# Patient Record
Sex: FEMALE | Race: WHITE | NOT HISPANIC OR LATINO | Employment: OTHER | URBAN - METROPOLITAN AREA
[De-identification: names, ages, dates, MRNs, and addresses within clinical notes are randomized per-mention and may not be internally consistent; named-entity substitution may affect disease eponyms.]

---

## 2017-05-02 DIAGNOSIS — Z86.73 ARTERIAL ISCHEMIC STROKE, CHRONIC: ICD-10-CM

## 2017-05-02 DIAGNOSIS — I10 ESSENTIAL HYPERTENSION: Primary | ICD-10-CM

## 2017-05-10 ENCOUNTER — LAB VISIT (OUTPATIENT)
Dept: LAB | Facility: HOSPITAL | Age: 82
End: 2017-05-10
Attending: FAMILY MEDICINE
Payer: MEDICARE

## 2017-05-10 DIAGNOSIS — Z00.00 ROUTINE GENERAL MEDICAL EXAMINATION AT A HEALTH CARE FACILITY: ICD-10-CM

## 2017-05-10 DIAGNOSIS — I10 ESSENTIAL HYPERTENSION: ICD-10-CM

## 2017-05-10 DIAGNOSIS — Z86.73 ARTERIAL ISCHEMIC STROKE, CHRONIC: ICD-10-CM

## 2017-05-10 LAB
ALBUMIN SERPL BCP-MCNC: 3.8 G/DL
ALP SERPL-CCNC: 68 U/L
ALT SERPL W/O P-5'-P-CCNC: 19 U/L
ANION GAP SERPL CALC-SCNC: 12 MMOL/L
AST SERPL-CCNC: 26 U/L
BASOPHILS # BLD AUTO: 0.02 K/UL
BASOPHILS NFR BLD: 0.3 %
BILIRUB SERPL-MCNC: 0.5 MG/DL
BUN SERPL-MCNC: 23 MG/DL
CALCIUM SERPL-MCNC: 9.5 MG/DL
CHLORIDE SERPL-SCNC: 103 MMOL/L
CHOLEST/HDLC SERPL: 3.7 {RATIO}
CO2 SERPL-SCNC: 25 MMOL/L
CREAT SERPL-MCNC: 1.1 MG/DL
DIFFERENTIAL METHOD: ABNORMAL
EOSINOPHIL # BLD AUTO: 0 K/UL
EOSINOPHIL NFR BLD: 0.6 %
ERYTHROCYTE [DISTWIDTH] IN BLOOD BY AUTOMATED COUNT: 13.7 %
EST. GFR  (AFRICAN AMERICAN): 53.7 ML/MIN/1.73 M^2
EST. GFR  (NON AFRICAN AMERICAN): 46.5 ML/MIN/1.73 M^2
GLUCOSE SERPL-MCNC: 88 MG/DL
HCT VFR BLD AUTO: 44.5 %
HDL/CHOLESTEROL RATIO: 27.2 %
HDLC SERPL-MCNC: 217 MG/DL
HDLC SERPL-MCNC: 59 MG/DL
HGB BLD-MCNC: 14.7 G/DL
LDLC SERPL CALC-MCNC: 141.2 MG/DL
LYMPHOCYTES # BLD AUTO: 2.1 K/UL
LYMPHOCYTES NFR BLD: 30.4 %
MCH RBC QN AUTO: 32 PG
MCHC RBC AUTO-ENTMCNC: 33 %
MCV RBC AUTO: 97 FL
MONOCYTES # BLD AUTO: 0.6 K/UL
MONOCYTES NFR BLD: 8.4 %
NEUTROPHILS # BLD AUTO: 4.1 K/UL
NEUTROPHILS NFR BLD: 60.2 %
NONHDLC SERPL-MCNC: 158 MG/DL
PLATELET # BLD AUTO: 243 K/UL
PMV BLD AUTO: 11.2 FL
POTASSIUM SERPL-SCNC: 4.3 MMOL/L
PROT SERPL-MCNC: 7 G/DL
RBC # BLD AUTO: 4.59 M/UL
SODIUM SERPL-SCNC: 140 MMOL/L
TRIGL SERPL-MCNC: 84 MG/DL
TSH SERPL DL<=0.005 MIU/L-ACNC: 1.84 UIU/ML
WBC # BLD AUTO: 6.78 K/UL

## 2017-05-10 PROCEDURE — 80053 COMPREHEN METABOLIC PANEL: CPT

## 2017-05-10 PROCEDURE — 36415 COLL VENOUS BLD VENIPUNCTURE: CPT | Mod: PO

## 2017-05-10 PROCEDURE — 84443 ASSAY THYROID STIM HORMONE: CPT

## 2017-05-10 PROCEDURE — 80061 LIPID PANEL: CPT

## 2017-05-10 PROCEDURE — 85025 COMPLETE CBC W/AUTO DIFF WBC: CPT

## 2017-05-15 ENCOUNTER — OFFICE VISIT (OUTPATIENT)
Dept: INTERNAL MEDICINE | Facility: CLINIC | Age: 82
End: 2017-05-15
Payer: MEDICARE

## 2017-05-15 VITALS
HEIGHT: 65 IN | HEART RATE: 90 BPM | TEMPERATURE: 98 F | DIASTOLIC BLOOD PRESSURE: 80 MMHG | BODY MASS INDEX: 26.93 KG/M2 | WEIGHT: 161.63 LBS | SYSTOLIC BLOOD PRESSURE: 128 MMHG

## 2017-05-15 DIAGNOSIS — Z86.73 HISTORY OF STROKE: ICD-10-CM

## 2017-05-15 DIAGNOSIS — E78.5 HYPERLIPIDEMIA, UNSPECIFIED HYPERLIPIDEMIA TYPE: ICD-10-CM

## 2017-05-15 DIAGNOSIS — N18.3 CHRONIC KIDNEY DISEASE (CKD), STAGE 3 (MODERATE): ICD-10-CM

## 2017-05-15 DIAGNOSIS — Z00.00 ROUTINE GENERAL MEDICAL EXAMINATION AT A HEALTH CARE FACILITY: Primary | ICD-10-CM

## 2017-05-15 PROBLEM — N18.30 STAGE 3 CHRONIC KIDNEY DISEASE: Status: ACTIVE | Noted: 2017-05-15

## 2017-05-15 PROCEDURE — 3079F DIAST BP 80-89 MM HG: CPT | Mod: S$GLB,,, | Performed by: FAMILY MEDICINE

## 2017-05-15 PROCEDURE — 99499 UNLISTED E&M SERVICE: CPT | Mod: S$GLB,,, | Performed by: FAMILY MEDICINE

## 2017-05-15 PROCEDURE — 99999 PR PBB SHADOW E&M-EST. PATIENT-LVL III: CPT | Mod: PBBFAC,,, | Performed by: FAMILY MEDICINE

## 2017-05-15 PROCEDURE — 3074F SYST BP LT 130 MM HG: CPT | Mod: S$GLB,,, | Performed by: FAMILY MEDICINE

## 2017-05-15 PROCEDURE — 99397 PER PM REEVAL EST PAT 65+ YR: CPT | Mod: S$GLB,,, | Performed by: FAMILY MEDICINE

## 2017-05-15 NOTE — PROGRESS NOTES
Subjective:      Patient ID: Samanta Charlton is a 83 y.o. female.    Chief Complaint: Annual Exam    HPI Comments: Patient is here today for prevention exam.  She's really been working hard on her diet and exercising more.  Her cholesterol is  improving.  She's reduced the salt in her diet.  Her blood pressure looks great today.  She's only taking a daily aspirin and vitamins. Wt is stable. She has tried multiple antihypertensives and did not tolerate any of them.    She declines a flu shot and mammogram. Hx of aterial stroke but no deficits. Is doing  exercise.       Lab Results   Component Value Date    WBC 6.78 05/10/2017    HGB 14.7 05/10/2017    HCT 44.5 05/10/2017     05/10/2017    CHOL 217 (H) 05/10/2017    TRIG 84 05/10/2017    HDL 59 05/10/2017    ALT 19 05/10/2017    AST 26 05/10/2017     05/10/2017    K 4.3 05/10/2017     05/10/2017    CREATININE 1.1 05/10/2017    BUN 23 05/10/2017    CO2 25 05/10/2017    TSH 1.843 05/10/2017       Review of Systems   Constitutional: Positive for activity change and appetite change. Negative for chills, fatigue and fever.   HENT: Negative for ear pain and trouble swallowing.    Eyes: Negative for pain and visual disturbance.   Respiratory: Negative for cough and shortness of breath.    Cardiovascular: Negative for chest pain and leg swelling.   Gastrointestinal: Negative for abdominal pain, blood in stool, nausea and vomiting.   Endocrine: Negative for cold intolerance and heat intolerance.   Genitourinary: Negative for dysuria and frequency.   Musculoskeletal: Negative for joint swelling, myalgias and neck pain.   Skin: Negative for color change and rash.   Neurological: Negative for dizziness and headaches.   Psychiatric/Behavioral: Negative for behavioral problems and sleep disturbance.     Objective:     Physical Exam   Constitutional: She is oriented to person, place, and time. She appears well-developed and well-nourished.   HENT:   Head:  Normocephalic and atraumatic.   Right Ear: External ear normal.   Left Ear: External ear normal.   Mouth/Throat: Oropharynx is clear and moist.   Eyes: EOM are normal.   Neck: Normal range of motion. Neck supple. Carotid bruit is not present. No thyromegaly present.   Cardiovascular: Normal rate and regular rhythm.  Exam reveals no gallop and no friction rub.    No murmur heard.  Pulmonary/Chest: Effort normal. No respiratory distress. She has no wheezes. She has no rales.   Abdominal: Soft. Bowel sounds are normal. She exhibits no distension. There is no tenderness. There is no rebound.   Musculoskeletal: Normal range of motion. She exhibits no edema.   Lymphadenopathy:     She has no cervical adenopathy.   Neurological: She is alert and oriented to person, place, and time.   Skin: Skin is warm and dry. No rash noted.   Psychiatric: She has a normal mood and affect. Her behavior is normal. Judgment and thought content normal.   Vitals reviewed.    Assessment:     1. Routine general medical examination at a health care facility    2. History of stroke    3. Hyperlipidemia, unspecified hyperlipidemia type    4. Chronic kidney disease (CKD), stage 3 (moderate)      Plan:   Samanta was seen today for annual exam.    Diagnoses and all orders for this visit:    Routine general medical examination at a health care facility - - labs reviewed. Discussed Health Maintenance issues.     -     Lipid panel; Future  -     Comprehensive metabolic panel; Future    History of stroke-currently on an aspirin declines statin therapy declines blood pressure medication.  -     Lipid panel; Future  -     Comprehensive metabolic panel; Future    Hyperlipidemia, unspecified hyperlipidemia type-improving control with diet alone declines statin therapy.  -     Lipid panel; Future  -     Comprehensive metabolic panel; Future      Chronic kidney disease-secondary to decreased water consumption and age.  Stressed importance of increasing water and  diet.      Return in about 1 year (around 5/15/2018) for physical.

## 2018-04-05 ENCOUNTER — TELEPHONE (OUTPATIENT)
Dept: INTERNAL MEDICINE | Facility: CLINIC | Age: 83
End: 2018-04-05

## 2018-04-05 DIAGNOSIS — E55.9 VITAMIN D DEFICIENCY: Primary | ICD-10-CM

## 2018-04-05 DIAGNOSIS — E61.1 IRON DEFICIENCY: ICD-10-CM

## 2018-04-05 NOTE — TELEPHONE ENCOUNTER
----- Message from Stephanie Wood sent at 4/5/2018 12:55 PM CDT -----  Contact: Patient  Patient is requesting specific labs before she makes an annual appt, please call her back at [phone]. Thank you

## 2018-04-05 NOTE — TELEPHONE ENCOUNTER
Pt is scheduled for her annual in May, she has blood work the week before. She would like to know if she can have a Vit D level and then her iron checked. She as of now has a CMP and Lipid in.

## 2018-05-15 ENCOUNTER — LAB VISIT (OUTPATIENT)
Dept: LAB | Facility: HOSPITAL | Age: 83
End: 2018-05-15
Attending: FAMILY MEDICINE
Payer: MEDICARE

## 2018-05-15 DIAGNOSIS — E78.5 HYPERLIPIDEMIA, UNSPECIFIED HYPERLIPIDEMIA TYPE: ICD-10-CM

## 2018-05-15 DIAGNOSIS — E55.9 VITAMIN D DEFICIENCY: ICD-10-CM

## 2018-05-15 DIAGNOSIS — Z00.00 ROUTINE GENERAL MEDICAL EXAMINATION AT A HEALTH CARE FACILITY: ICD-10-CM

## 2018-05-15 DIAGNOSIS — Z86.73 HISTORY OF STROKE: ICD-10-CM

## 2018-05-15 DIAGNOSIS — E61.1 IRON DEFICIENCY: ICD-10-CM

## 2018-05-15 LAB
25(OH)D3+25(OH)D2 SERPL-MCNC: 31 NG/ML
ALBUMIN SERPL BCP-MCNC: 3.9 G/DL
ALP SERPL-CCNC: 65 U/L
ALT SERPL W/O P-5'-P-CCNC: 13 U/L
ANION GAP SERPL CALC-SCNC: 10 MMOL/L
AST SERPL-CCNC: 22 U/L
BASOPHILS # BLD AUTO: 0.03 K/UL
BASOPHILS NFR BLD: 0.5 %
BILIRUB SERPL-MCNC: 0.7 MG/DL
BUN SERPL-MCNC: 21 MG/DL
CALCIUM SERPL-MCNC: 10.4 MG/DL
CHLORIDE SERPL-SCNC: 104 MMOL/L
CHOLEST SERPL-MCNC: 216 MG/DL
CHOLEST/HDLC SERPL: 3.9 {RATIO}
CO2 SERPL-SCNC: 28 MMOL/L
CREAT SERPL-MCNC: 0.9 MG/DL
DIFFERENTIAL METHOD: ABNORMAL
EOSINOPHIL # BLD AUTO: 0.1 K/UL
EOSINOPHIL NFR BLD: 0.9 %
ERYTHROCYTE [DISTWIDTH] IN BLOOD BY AUTOMATED COUNT: 14.2 %
EST. GFR  (AFRICAN AMERICAN): >60 ML/MIN/1.73 M^2
EST. GFR  (NON AFRICAN AMERICAN): 58.9 ML/MIN/1.73 M^2
GLUCOSE SERPL-MCNC: 77 MG/DL
HCT VFR BLD AUTO: 46 %
HDLC SERPL-MCNC: 56 MG/DL
HDLC SERPL: 25.9 %
HGB BLD-MCNC: 14.7 G/DL
IMM GRANULOCYTES # BLD AUTO: 0.02 K/UL
IMM GRANULOCYTES NFR BLD AUTO: 0.3 %
IRON SERPL-MCNC: 86 UG/DL
LDLC SERPL CALC-MCNC: 142.2 MG/DL
LYMPHOCYTES # BLD AUTO: 1.5 K/UL
LYMPHOCYTES NFR BLD: 26.7 %
MCH RBC QN AUTO: 32.1 PG
MCHC RBC AUTO-ENTMCNC: 32 G/DL
MCV RBC AUTO: 100 FL
MONOCYTES # BLD AUTO: 0.6 K/UL
MONOCYTES NFR BLD: 10.6 %
NEUTROPHILS # BLD AUTO: 3.5 K/UL
NEUTROPHILS NFR BLD: 61 %
NONHDLC SERPL-MCNC: 160 MG/DL
NRBC BLD-RTO: 0 /100 WBC
PLATELET # BLD AUTO: 231 K/UL
PMV BLD AUTO: 10.9 FL
POTASSIUM SERPL-SCNC: 4.6 MMOL/L
PROT SERPL-MCNC: 7 G/DL
RBC # BLD AUTO: 4.58 M/UL
SATURATED IRON: 23 %
SODIUM SERPL-SCNC: 142 MMOL/L
TOTAL IRON BINDING CAPACITY: 371 UG/DL
TRANSFERRIN SERPL-MCNC: 251 MG/DL
TRIGL SERPL-MCNC: 89 MG/DL
WBC # BLD AUTO: 5.76 K/UL

## 2018-05-15 PROCEDURE — 82306 VITAMIN D 25 HYDROXY: CPT

## 2018-05-15 PROCEDURE — 80053 COMPREHEN METABOLIC PANEL: CPT

## 2018-05-15 PROCEDURE — 80061 LIPID PANEL: CPT

## 2018-05-15 PROCEDURE — 83540 ASSAY OF IRON: CPT

## 2018-05-15 PROCEDURE — 85025 COMPLETE CBC W/AUTO DIFF WBC: CPT

## 2018-05-15 PROCEDURE — 36415 COLL VENOUS BLD VENIPUNCTURE: CPT | Mod: PO

## 2018-05-23 ENCOUNTER — OFFICE VISIT (OUTPATIENT)
Dept: INTERNAL MEDICINE | Facility: CLINIC | Age: 83
End: 2018-05-23
Payer: MEDICARE

## 2018-05-23 VITALS
HEIGHT: 65 IN | TEMPERATURE: 99 F | BODY MASS INDEX: 26.67 KG/M2 | SYSTOLIC BLOOD PRESSURE: 138 MMHG | DIASTOLIC BLOOD PRESSURE: 88 MMHG | WEIGHT: 160.06 LBS

## 2018-05-23 DIAGNOSIS — N18.30 STAGE 3 CHRONIC KIDNEY DISEASE: ICD-10-CM

## 2018-05-23 DIAGNOSIS — I77.1 TORTUOUS AORTA: ICD-10-CM

## 2018-05-23 DIAGNOSIS — E78.5 HYPERLIPIDEMIA, UNSPECIFIED HYPERLIPIDEMIA TYPE: ICD-10-CM

## 2018-05-23 DIAGNOSIS — Z00.00 ROUTINE GENERAL MEDICAL EXAMINATION AT A HEALTH CARE FACILITY: Primary | ICD-10-CM

## 2018-05-23 PROCEDURE — 99499 UNLISTED E&M SERVICE: CPT | Mod: S$GLB,,, | Performed by: FAMILY MEDICINE

## 2018-05-23 PROCEDURE — 99999 PR PBB SHADOW E&M-EST. PATIENT-LVL III: CPT | Mod: PBBFAC,,, | Performed by: FAMILY MEDICINE

## 2018-05-23 PROCEDURE — 99397 PER PM REEVAL EST PAT 65+ YR: CPT | Mod: S$GLB,,, | Performed by: FAMILY MEDICINE

## 2018-05-23 PROCEDURE — 3079F DIAST BP 80-89 MM HG: CPT | Mod: CPTII,S$GLB,, | Performed by: FAMILY MEDICINE

## 2018-05-23 PROCEDURE — 3075F SYST BP GE 130 - 139MM HG: CPT | Mod: CPTII,S$GLB,, | Performed by: FAMILY MEDICINE

## 2018-05-23 RX ORDER — AMOXICILLIN 500 MG
CAPSULE ORAL DAILY
COMMUNITY

## 2018-05-23 RX ORDER — MULTIVIT WITH MINERALS/HERBS
1 TABLET ORAL DAILY
COMMUNITY

## 2018-05-23 RX ORDER — GUAIFENESIN AND PHENYLEPHRINE HCL 400; 10 MG/1; MG/1
TABLET ORAL
COMMUNITY

## 2018-05-23 RX ORDER — MAGNESIUM 30 MG
TABLET ORAL ONCE
COMMUNITY

## 2018-05-23 RX ORDER — FERROUS SULFATE, DRIED 160(50) MG
1 TABLET, EXTENDED RELEASE ORAL 2 TIMES DAILY WITH MEALS
COMMUNITY

## 2018-05-27 PROBLEM — I77.1 TORTUOUS AORTA: Status: ACTIVE | Noted: 2018-05-27

## 2018-05-27 NOTE — PROGRESS NOTES
Subjective:      Patient ID: Samanta Charlton is a 84 y.o. female.    Chief Complaint: Annual Exam    Disclaimer:  This note is prepared using voice recognition software and as such is likely to have errors and has not been proof read. Please contact me for questions.     Patient is here today for prevention exam.  She's really been working hard on her diet and exercising more.  Her cholesterol is  improving.  She's reduced the salt in her diet.  Her blood pressure looks great today.  She's only taking a daily aspirin and vitamins. Wt is stable, she has also been exercising more.  Trying to swim.  Going to be moving in to the pull house near her children.  On multivitamin B complex , omega 3, and co- Q10.. She has tried multiple antihypertensives and did not tolerate any of them.    She declines a flu shot and mammogram. Hx of aterial stroke but no deficits.  Declines doing any prescription medications for blood pressure control.  Does have issues of stage 3 chronic kidney disease mainly due to age.  Has some aortic tortuosity but not willing to statin therapy.  Does take an aspirin intermittently.  I an unable to review Legacy documents from 2010 at this time to determine if there is a calcified granuloma.  There is no record of imaging in our system currently with epic at this time.  In particular no CT scans available for review at this time.          Lab Results   Component Value Date    WBC 5.76 05/15/2018    HGB 14.7 05/15/2018    HCT 46.0 05/15/2018     05/15/2018    CHOL 216 (H) 05/15/2018    TRIG 89 05/15/2018    HDL 56 05/15/2018    ALT 13 05/15/2018    AST 22 05/15/2018     05/15/2018    K 4.6 05/15/2018     05/15/2018    CREATININE 0.9 05/15/2018    BUN 21 05/15/2018    CO2 28 05/15/2018    TSH 1.843 05/10/2017       Review of Systems   Constitutional: Negative for chills, fatigue and fever.   HENT: Negative for ear pain and trouble swallowing.    Eyes: Negative for pain and visual  "disturbance.   Respiratory: Negative for cough and shortness of breath.    Cardiovascular: Negative for chest pain and leg swelling.   Gastrointestinal: Negative for abdominal pain, blood in stool, nausea and vomiting.   Endocrine: Negative for cold intolerance and heat intolerance.   Genitourinary: Negative for dysuria and frequency.   Musculoskeletal: Negative for joint swelling, myalgias and neck pain.   Skin: Negative for color change and rash.   Neurological: Negative for dizziness and headaches.   Psychiatric/Behavioral: Negative for behavioral problems and sleep disturbance.     Objective:     Vitals:    05/23/18 1307 05/23/18 1337   BP:  138/88   Temp: 98.9 °F (37.2 °C)    TempSrc: Tympanic    Weight: 72.6 kg (160 lb 0.9 oz)    Height: 5' 4.5" (1.638 m)      Physical Exam   Constitutional: She is oriented to person, place, and time. She appears well-developed and well-nourished.   HENT:   Head: Normocephalic and atraumatic.   Right Ear: External ear normal.   Left Ear: External ear normal.   Mouth/Throat: Oropharynx is clear and moist.   Eyes: EOM are normal.   Neck: Normal range of motion. Neck supple. No thyromegaly present.   Cardiovascular: Normal rate and regular rhythm.  Exam reveals no gallop and no friction rub.    No murmur heard.  Pulmonary/Chest: Effort normal. No respiratory distress. She has no wheezes. She has no rales.   Abdominal: Soft. Bowel sounds are normal. She exhibits no distension. There is no tenderness. There is no rebound.   Musculoskeletal: Normal range of motion. She exhibits no edema.   Lymphadenopathy:     She has no cervical adenopathy.   Neurological: She is alert and oriented to person, place, and time.   Skin: Skin is warm and dry. No rash noted.   Psychiatric: She has a normal mood and affect. Her behavior is normal. Judgment and thought content normal.   Vitals reviewed.    Assessment:     1. Routine general medical examination at a health care facility    2. Stage 3 " chronic kidney disease    3. Hyperlipidemia, unspecified hyperlipidemia type    4. Tortuous aorta      Plan:   Samanta was seen today for annual exam.    Diagnoses and all orders for this visit:    Routine general medical examination at a health care facility-labs reviewed today discussed health maintenance issues declines prescription medications at this time  Declines vaccines.      Stage 3 chronic kidney disease.  - stable, Continue with current medications and interventions. Labs reviewed.     Hyperlipidemia, unspecified hyperlipidemia type - stable, Continue with current supplements and interventions. Labs reviewed.       Tortuous aorta - stable, Continue with current supplements and interventions. Labs reviewed.             Follow-up in about 1 year (around 5/23/2019) for physical with Dr GUTIERRES.

## 2018-06-25 ENCOUNTER — TELEPHONE (OUTPATIENT)
Dept: INTERNAL MEDICINE | Facility: CLINIC | Age: 83
End: 2018-06-25

## 2018-06-25 NOTE — TELEPHONE ENCOUNTER
----- Message from Chip Solano sent at 6/25/2018  5:23 PM CDT -----  Contact: Pt  Pt would like to be called back regarding having a twist right knee.   Pt stated they went to the [facility] Last Thursday night and would like to have an franklin asap.Tomorrow After 1:30pm or next day after 2:00pm.  Pt can be reached at [phone].    Thank You.

## 2018-06-25 NOTE — TELEPHONE ENCOUNTER
Patient states that she twisted her knee Thursday and went to the Er at [facility] and they did xray bit did not give her pain meds, a brace and they were worthless rec req from [facility] to see if patient need to see you or ortho

## 2018-06-26 DIAGNOSIS — M25.561 RIGHT KNEE PAIN, UNSPECIFIED CHRONICITY: Primary | ICD-10-CM

## 2018-06-26 NOTE — TELEPHONE ENCOUNTER
I called and advised patient that  reviewed rec from the [facility] and recc she see a ortho specialist, offered to schedule franklin and patient declined and I gave her contact info for ortho becouse she verbalized that she would have to have someone take her. I also advised patient that she may want to contact [facility] to ask about get a copy / disc of images to poss avoid having to be xrayed again and patient expressed understanding.[email or web address]

## 2018-06-27 ENCOUNTER — HOSPITAL ENCOUNTER (OUTPATIENT)
Dept: RADIOLOGY | Facility: HOSPITAL | Age: 83
Discharge: HOME | End: 2018-06-27
Attending: ORTHOPAEDIC SURGERY
Payer: MEDICARE

## 2018-06-27 ENCOUNTER — OFFICE VISIT (OUTPATIENT)
Dept: ORTHOPEDICS | Facility: CLINIC | Age: 83
End: 2018-06-27
Payer: MEDICARE

## 2018-06-27 ENCOUNTER — PES CALL (OUTPATIENT)
Dept: ADMINISTRATIVE | Facility: CLINIC | Age: 83
End: 2018-06-27

## 2018-06-27 VITALS
HEIGHT: 65 IN | BODY MASS INDEX: 26.67 KG/M2 | DIASTOLIC BLOOD PRESSURE: 75 MMHG | SYSTOLIC BLOOD PRESSURE: 149 MMHG | HEART RATE: 77 BPM | WEIGHT: 160.06 LBS

## 2018-06-27 DIAGNOSIS — M25.561 CHRONIC PAIN OF RIGHT KNEE: Primary | ICD-10-CM

## 2018-06-27 DIAGNOSIS — S76.311A STRAIN OF RIGHT HAMSTRING: ICD-10-CM

## 2018-06-27 DIAGNOSIS — S83.411A SPRAIN OF MEDIAL COLLATERAL LIGAMENT OF RIGHT KNEE, INITIAL ENCOUNTER: Primary | ICD-10-CM

## 2018-06-27 DIAGNOSIS — M25.561 RIGHT KNEE PAIN, UNSPECIFIED CHRONICITY: ICD-10-CM

## 2018-06-27 DIAGNOSIS — R52 PAIN: Primary | ICD-10-CM

## 2018-06-27 DIAGNOSIS — G89.29 CHRONIC PAIN OF RIGHT KNEE: Primary | ICD-10-CM

## 2018-06-27 PROCEDURE — 73562 X-RAY EXAM OF KNEE 3: CPT | Mod: TC,59,FY,PO,LT

## 2018-06-27 PROCEDURE — 99999 PR PBB SHADOW E&M-EST. PATIENT-LVL III: CPT | Mod: PBBFAC,,, | Performed by: ORTHOPAEDIC SURGERY

## 2018-06-27 PROCEDURE — 3077F SYST BP >= 140 MM HG: CPT | Mod: CPTII,S$GLB,, | Performed by: ORTHOPAEDIC SURGERY

## 2018-06-27 PROCEDURE — 73562 X-RAY EXAM OF KNEE 3: CPT | Mod: 26,XS,LT, | Performed by: RADIOLOGY

## 2018-06-27 PROCEDURE — 73564 X-RAY EXAM KNEE 4 OR MORE: CPT | Mod: 26,RT,, | Performed by: RADIOLOGY

## 2018-06-27 PROCEDURE — 3078F DIAST BP <80 MM HG: CPT | Mod: CPTII,S$GLB,, | Performed by: ORTHOPAEDIC SURGERY

## 2018-06-27 PROCEDURE — 99204 OFFICE O/P NEW MOD 45 MIN: CPT | Mod: S$GLB,,, | Performed by: ORTHOPAEDIC SURGERY

## 2018-06-27 NOTE — PROGRESS NOTES
Subjective:     Patient ID: Samanta Charlton is a 84 y.o. female.    Chief Complaint: Pain of the Right Knee    Samanta Charlton is a 84 y.o. female here for R knee pain was moving a pot on a skateboard and bent her knee. Pain has improved.      Knee Pain    The pain is present in the right knee. This is a new problem. The current episode started in the past 7 days. The injury was the result of a twisting action while at home. The problem occurs intermittently. The problem has been gradually worsening. The quality of the pain is described as aching. The pain is at a severity of 0/10. Associated symptoms include joint swelling. Pertinent negatives include no fever or numbness. The symptoms are aggravated by activity. She has tried heat and cold for the symptoms. The treatment provided mild relief. Physical therapy was not tried.      Past Medical History:   Diagnosis Date    Acute ischemic stroke     Allergic rhinitis     HTN (hypertension)     Hyperlipidemia     Osteoporosis, unspecified     Sinusitis      Past Surgical History:   Procedure Laterality Date    HYSTERECTOMY       History reviewed. No pertinent family history.  Social History     Social History    Marital status:      Spouse name: N/A    Number of children: N/A    Years of education: N/A     Occupational History    Not on file.     Social History Main Topics    Smoking status: Former Smoker    Smokeless tobacco: Never Used    Alcohol use No    Drug use: No    Sexual activity: No     Other Topics Concern    Not on file     Social History Narrative    No narrative on file     Medication List with Changes/Refills   Current Medications    B COMPLEX VITAMINS TABLET    Take 1 tablet by mouth once daily.    CALCIUM-VITAMIN D3 500 MG(1,250MG) -200 UNIT PER TABLET    Take 1 tablet by mouth 2 (two) times daily with meals.    FISH OIL-OMEGA-3 FATTY ACIDS 300-1,000 MG CAPSULE    Take by mouth once daily.    MAGNESIUM 30 MG TAB    Take by  mouth once.    MULTIVITAMIN WITH MINERALS TABLET    Take 1 tablet by mouth once daily.    TURMERIC ROOT EXTRACT 500 MG CAP    Take by mouth.     Review of patient's allergies indicates:   Allergen Reactions    Ace inhibitors Nausea And Vomiting    Amlodipine Edema    Codeine      Other reaction(s): Hallucinations    Hydrochlorothiazide Nausea And Vomiting    Sulfa (sulfonamide antibiotics) Nausea And Vomiting    Benicar  [olmesartan] Rash     Review of Systems   Constitution: Negative for fever and night sweats.   HENT: Positive for hearing loss.    Eyes: Negative for blurred vision and visual disturbance.   Cardiovascular: Positive for leg swelling. Negative for chest pain.   Respiratory: Negative for shortness of breath.    Endocrine: Negative for polyuria.   Hematologic/Lymphatic: Negative for bleeding problem.   Skin: Negative for rash.   Musculoskeletal: Positive for joint pain. Negative for back pain, joint swelling, muscle cramps and muscle weakness.   Gastrointestinal: Negative for melena.   Genitourinary: Negative for hematuria.   Neurological: Negative for loss of balance, numbness and paresthesias.   Psychiatric/Behavioral: Negative for altered mental status.       Objective:   Body mass index is 27.05 kg/m².  Vitals:    06/27/18 1346   BP: (!) 149/75   Pulse: 77       General: Samanta is well-developed, well-nourished, appears stated age, in no acute distress, alert and oriented to time, place and person.       General    Vitals reviewed.  Constitutional: She is oriented to person, place, and time. She appears well-developed and well-nourished. No distress.   HENT:   Mouth/Throat: No oropharyngeal exudate.   Eyes: Right eye exhibits no discharge. Left eye exhibits no discharge.   Neck: Normal range of motion.   Pulmonary/Chest: Effort normal. No respiratory distress.   Neurological: She is alert and oriented to person, place, and time. She has normal reflexes. No cranial nerve deficit. Coordination  normal.   Psychiatric: She has a normal mood and affect. Her behavior is normal. Judgment and thought content normal.     General Musculoskeletal Exam   Gait: normal       Right Knee Exam     Inspection   Erythema: absent  Scars: absent  Swelling: absent  Effusion: effusion  Deformity: deformity  Bruising: present    Tenderness   The patient is tender to palpation of the MCL and medial hamstring.    Range of Motion   Extension: 0   Flexion: 130     Tests   Meniscus   Charlette:  Medial - negative Lateral - negative  Ligament Examination Lachman: normal (-1 to 2mm) PCL-Posterior Drawer: normal (0 to 2mm)     MCL - Valgus: abnormal - grade I  LCL - Varus: normal  Patella   Patellar Apprehension: negative  Passive Patellar Tilt: neutral  Patellar Tracking: normal  Patellar Glide (quadrants): Lateral - 1   Medial - 2  Q-Angle at 90 degrees: normal  Patellar Grind: positive    Other   Meniscal Cyst: absent  Popliteal (Baker's) Cyst: absent  Sensation: normal    Left Knee Exam     Inspection   Erythema: absent  Scars: absent  Swelling: absent  Effusion: absent  Deformity: deformity  Bruising: absent    Tenderness   The patient is experiencing no tenderness.         Range of Motion   Extension: 0   Flexion: 130     Tests   Meniscus   Charlette:  Medial - negative Lateral - negative  Stability Lachman: normal (-1 to 2mm)   MCL - Valgus: normal (0 to 2mm)  LCL - Varus: normal (0 to 2mm)  Patella   Patellar Apprehension: negative  Passive Patellar Tilt: neutral  Patellar Tracking: normal  Patellar Glide (Quadrants): Lateral - 1 Medial - 2  Q-Angle at 90 degrees: normal  Patellar Grind: negative    Other   Meniscal Cyst: absent  Popliteal (Baker's) Cyst: absent  Sensation: normal    Right Hip Exam     Tests   Hardik: negative  Left Hip Exam     Tests   Hardik: negative          Muscle Strength   Right Lower Extremity   Hip Abduction: 5/5   Quadriceps:  4/5   Hamstrin/5   Left Lower Extremity   Hip Abduction: 5/5   Quadriceps:   5/5   Hamstrin/5     Reflexes     Left Side  Quadriceps:  2+  Achilles:  2+    Right Side   Quadriceps:  2+  Achilles:  2+    Vascular Exam     Right Pulses  Dorsalis Pedis:      2+  Posterior Tibial:      2+        Left Pulses  Dorsalis Pedis:      2+  Posterior Tibial:      2+          X-rays including standing, weight bearing AP and flexion bilateral knees, lateral and merchant views ordered and images reviewed by me show:    No fracture, dislocation or other pathology   Medial compartment: mild degenerative changes   Lateral compartment: no degenerative changes   Patellofemoral compartment: no degenerative changes   Mild calcification right MCL    Assessment:     Encounter Diagnoses   Name Primary?    Sprain of medial collateral ligament of right knee, initial encounter Yes    Strain of right hamstring         Plan:     1. Hinged knee brace    2. Continue nsaids    3. PT/OT    4. F/up 4-6wk

## 2018-07-02 ENCOUNTER — TELEPHONE (OUTPATIENT)
Dept: INTERNAL MEDICINE | Facility: CLINIC | Age: 83
End: 2018-07-02

## 2018-07-02 NOTE — TELEPHONE ENCOUNTER
PT needed for Right Knee. Requesting to see about doing HH PT. She is not driving. They recommended her to do therapy twice a week. She is okay with using [facility] .

## 2018-07-02 NOTE — TELEPHONE ENCOUNTER
----- Message from Deon Arreguin sent at 7/2/2018 11:17 AM CDT -----  Contact: pt [phone]  States she's calling rg being referred from her Ortho to see phys therapy and wants to knw if she can have phys therapy home health and can be reached at [phone]//[email or web address]

## 2018-07-19 ENCOUNTER — TELEPHONE (OUTPATIENT)
Dept: ADMINISTRATIVE | Facility: CLINIC | Age: 83
End: 2018-07-19

## 2019-02-14 NOTE — MR AVS SNAPSHOT
"    [facility]  [address] [place] [address]  Phone: [phone]  Fax: [phone]                  Samanta Charlton   5/15/2017 1:40 PM   Office Visit    Description:  Female : 1933   Provider:  Melody Sky MD   [facility]           Reason for Visit     Annual Exam           Diagnoses this Visit        Comments    Routine general medical examination at a health care facility    -  Primary     History of stroke         Hyperlipidemia, unspecified hyperlipidemia type                To Do List           Goals (5 Years of Data)     None      Follow-Up and Disposition     Return in about 1 year (around 5/15/2018) for physical.      [facility]     [facility] On Call Nurse Care Line -  Assistance  Unless otherwise directed by your provider, please contact [facility], our nurse care line that is available for  assistance.     Registered nurses in the [facility] On Call Center provide: appointment scheduling, clinical advisement, health education, and other advisory services.  Call: [phone] (toll free)               Medications           Message regarding Medications     Verify the changes and/or additions to your medication regime listed below are the same as discussed with your clinician today.  If any of these changes or additions are incorrect, please notify your healthcare provider.             Verify that the below list of medications is an accurate representation of the medications you are currently taking.  If none reported, the list may be blank. If incorrect, please contact your healthcare provider. Carry this list with you in case of emergency.           Current Medications     multivitamin with minerals tablet Take 1 tablet by mouth once daily.           Clinical Reference Information           Your Vitals Were     BP Pulse Temp Height Weight BMI    128/80 90 97.8 °F (36.6 °C) 5' 4.5" (1.638 m) 73.3 kg (161 lb " Telephone Encounter by Mary Chapman CMA at 11/12/18 10:24 AM     Author:  Mary Chapman CMA Service:  (none) Author Type:  Medical Assistant     Filed:  11/12/18 10:25 AM Encounter Date:  11/12/2018 Status:  Signed     :  Mary Chapman CMA (Medical Assistant)            Epic order -[JR1.1M] Shoulder pain[JR1.1C]    Left message on answering machine to call back.[JR1.1T]  Please schedule an appointment  With of the sports medicine providers - first available[JR1.1M]      Revision History        User Key Date/Time User Provider Type Action    > JR1.1 11/12/18 10:25 AM Mary Chapman CMA Medical Assistant Sign    C - Copied, M - Manual, T - Template             9.6 oz) 27.31 kg/m2      Blood Pressure          Most Recent Value    BP  128/80      Allergies as of 5/15/2017     Ace Inhibitors    Amlodipine    Codeine    Hydrochlorothiazide    Sulfa (Sulfonamide Antibiotics)    Benicar  [Olmesartan]      Immunizations Administered on Date of Encounter - 5/15/2017     None      Orders Placed During Today's Visit     Future Labs/Procedures Expected by Expires    Comprehensive metabolic panel  5/15/2018 6/19/2018    Lipid panel  5/15/2018 6/19/2018      [facility] Sign-Up     Activating your [facility] account is as easy as 1-2-3!     1) Visit [email or web address], select Sign Up Now, enter this activation code and your date of birth, then select Next.  [email or web address]  Expires: 6/19/2017  2:20 PM      2) Create a username and password to use when you visit [facility] in the future and select a security question in case you lose your password and select Next.    3) Enter your e-mail address and click Sign Up!    Additional Information  If you have questions, please e-mail [email or web address] or call [phone] to talk to our [facility] staff. Remember, [facility] is NOT to be used for urgent needs. For medical emergencies, dial 911.         Language Assistance Services     ATTENTION: Language assistance services are available, free of charge. Please call [phone].      ATENCIÓN: Si habla español, tiene a varner disposición servicios gratuitos de asistencia lingüística. Llame al [phone].     [facility] Ý: N?u b?n nói Ti?ng Vi?t, có các d?ch v? h? tr? ngôn ng? mi?n phí dành cho b?n. G?i s? [phone].         [facility] complies with applicable Federal civil rights laws and does not discriminate on the basis of race, color, national origin, age, disability, or sex.

## 2019-05-27 ENCOUNTER — PES CALL (OUTPATIENT)
Dept: ADMINISTRATIVE | Facility: CLINIC | Age: 84
End: 2019-05-27

## 2020-08-31 ENCOUNTER — HOSPITAL ENCOUNTER (OUTPATIENT)
Dept: RADIOLOGY | Facility: HOSPITAL | Age: 85
Discharge: HOME | End: 2020-08-31
Attending: FAMILY MEDICINE
Payer: MEDICARE

## 2020-08-31 ENCOUNTER — OFFICE VISIT (OUTPATIENT)
Dept: CARDIOLOGY | Facility: CLINIC | Age: 85
End: 2020-08-31
Payer: MEDICARE

## 2020-08-31 ENCOUNTER — TELEPHONE (OUTPATIENT)
Dept: INTERNAL MEDICINE | Facility: CLINIC | Age: 85
End: 2020-08-31

## 2020-08-31 ENCOUNTER — HOSPITAL ENCOUNTER (OUTPATIENT)
Facility: HOSPITAL | Age: 85
Discharge: HOSPICE | End: 2020-09-01
Attending: EMERGENCY MEDICINE | Admitting: INTERNAL MEDICINE
Payer: MEDICARE

## 2020-08-31 ENCOUNTER — OFFICE VISIT (OUTPATIENT)
Dept: INTERNAL MEDICINE | Facility: CLINIC | Age: 85
End: 2020-08-31
Payer: MEDICARE

## 2020-08-31 ENCOUNTER — HOSPITAL ENCOUNTER (OUTPATIENT)
Dept: CARDIOLOGY | Facility: HOSPITAL | Age: 85
Discharge: HOME | End: 2020-08-31
Attending: FAMILY MEDICINE
Payer: MEDICARE

## 2020-08-31 VITALS
WEIGHT: 140.88 LBS | OXYGEN SATURATION: 95 % | DIASTOLIC BLOOD PRESSURE: 70 MMHG | HEIGHT: 64 IN | BODY MASS INDEX: 24.05 KG/M2 | HEART RATE: 70 BPM | SYSTOLIC BLOOD PRESSURE: 172 MMHG

## 2020-08-31 VITALS
DIASTOLIC BLOOD PRESSURE: 98 MMHG | OXYGEN SATURATION: 95 % | SYSTOLIC BLOOD PRESSURE: 152 MMHG | HEART RATE: 80 BPM | WEIGHT: 140.88 LBS | BODY MASS INDEX: 23.47 KG/M2 | TEMPERATURE: 98 F | HEIGHT: 65 IN

## 2020-08-31 DIAGNOSIS — N18.30 STAGE 3 CHRONIC KIDNEY DISEASE: ICD-10-CM

## 2020-08-31 DIAGNOSIS — I10 ESSENTIAL HYPERTENSION: ICD-10-CM

## 2020-08-31 DIAGNOSIS — Z01.84 ENCOUNTER FOR ANTIBODY RESPONSE EXAMINATION: ICD-10-CM

## 2020-08-31 DIAGNOSIS — R00.0 TACHYCARDIA: ICD-10-CM

## 2020-08-31 DIAGNOSIS — R79.89 POSITIVE D DIMER: ICD-10-CM

## 2020-08-31 DIAGNOSIS — E78.5 HYPERLIPIDEMIA, UNSPECIFIED HYPERLIPIDEMIA TYPE: ICD-10-CM

## 2020-08-31 DIAGNOSIS — I44.7 LBBB (LEFT BUNDLE BRANCH BLOCK): ICD-10-CM

## 2020-08-31 DIAGNOSIS — K76.9 LESION OF LIVER: ICD-10-CM

## 2020-08-31 DIAGNOSIS — Z86.73 HISTORY OF STROKE: ICD-10-CM

## 2020-08-31 DIAGNOSIS — R06.09 DOE (DYSPNEA ON EXERTION): Primary | ICD-10-CM

## 2020-08-31 DIAGNOSIS — R07.9 ACUTE CHEST PAIN: ICD-10-CM

## 2020-08-31 DIAGNOSIS — R05.9 COUGH: ICD-10-CM

## 2020-08-31 DIAGNOSIS — E78.49 OTHER HYPERLIPIDEMIA: ICD-10-CM

## 2020-08-31 DIAGNOSIS — R07.9 CHEST PAIN, UNSPECIFIED TYPE: ICD-10-CM

## 2020-08-31 DIAGNOSIS — R06.00 DYSPNEA, UNSPECIFIED TYPE: Primary | ICD-10-CM

## 2020-08-31 DIAGNOSIS — R06.09 DOE (DYSPNEA ON EXERTION): ICD-10-CM

## 2020-08-31 PROBLEM — J96.01 ACUTE HYPOXEMIC RESPIRATORY FAILURE: Status: ACTIVE | Noted: 2020-08-31

## 2020-08-31 PROBLEM — R93.89 ABNORMAL CHEST CT: Status: ACTIVE | Noted: 2020-08-31

## 2020-08-31 PROBLEM — Z66 DNR (DO NOT RESUSCITATE): Status: ACTIVE | Noted: 2020-08-31

## 2020-08-31 PROBLEM — I77.1 TORTUOUS AORTA: Status: RESOLVED | Noted: 2018-05-27 | Resolved: 2020-08-31

## 2020-08-31 LAB
SARS-COV-2 RDRP RESP QL NAA+PROBE: NEGATIVE
TROPONIN I SERPL DL<=0.01 NG/ML-MCNC: 0.02 NG/ML (ref 0–0.03)

## 2020-08-31 PROCEDURE — 99205 PR OFFICE/OUTPT VISIT, NEW, LEVL V, 60-74 MIN: ICD-10-PCS | Mod: S$GLB,,, | Performed by: INTERNAL MEDICINE

## 2020-08-31 PROCEDURE — 84484 ASSAY OF TROPONIN QUANT: CPT

## 2020-08-31 PROCEDURE — G0378 HOSPITAL OBSERVATION PER HR: HCPCS

## 2020-08-31 PROCEDURE — 99215 OFFICE O/P EST HI 40 MIN: CPT | Mod: S$GLB,,, | Performed by: FAMILY MEDICINE

## 2020-08-31 PROCEDURE — 96361 HYDRATE IV INFUSION ADD-ON: CPT | Performed by: EMERGENCY MEDICINE

## 2020-08-31 PROCEDURE — 1101F PR PT FALLS ASSESS DOC 0-1 FALLS W/OUT INJ PAST YR: ICD-10-PCS | Mod: CPTII,S$GLB,, | Performed by: INTERNAL MEDICINE

## 2020-08-31 PROCEDURE — 93010 ELECTROCARDIOGRAM REPORT: CPT | Mod: ,,, | Performed by: INTERNAL MEDICINE

## 2020-08-31 PROCEDURE — 1126F AMNT PAIN NOTED NONE PRSNT: CPT | Mod: S$GLB,,, | Performed by: FAMILY MEDICINE

## 2020-08-31 PROCEDURE — 1159F PR MEDICATION LIST DOCUMENTED IN MEDICAL RECORD: ICD-10-PCS | Mod: S$GLB,,, | Performed by: INTERNAL MEDICINE

## 2020-08-31 PROCEDURE — 93005 ELECTROCARDIOGRAM TRACING: CPT

## 2020-08-31 PROCEDURE — 1159F PR MEDICATION LIST DOCUMENTED IN MEDICAL RECORD: ICD-10-PCS | Mod: S$GLB,,, | Performed by: FAMILY MEDICINE

## 2020-08-31 PROCEDURE — 99999 PR PBB SHADOW E&M-EST. PATIENT-LVL IV: ICD-10-PCS | Mod: PBBFAC,,, | Performed by: FAMILY MEDICINE

## 2020-08-31 PROCEDURE — 96374 THER/PROPH/DIAG INJ IV PUSH: CPT | Mod: 59 | Performed by: EMERGENCY MEDICINE

## 2020-08-31 PROCEDURE — 71046 X-RAY EXAM CHEST 2 VIEWS: CPT | Mod: TC,FY,PO

## 2020-08-31 PROCEDURE — 1126F AMNT PAIN NOTED NONE PRSNT: CPT | Mod: S$GLB,,, | Performed by: INTERNAL MEDICINE

## 2020-08-31 PROCEDURE — 99215 PR OFFICE/OUTPT VISIT, EST, LEVL V, 40-54 MIN: ICD-10-PCS | Mod: S$GLB,,, | Performed by: FAMILY MEDICINE

## 2020-08-31 PROCEDURE — 99999 PR PBB SHADOW E&M-EST. PATIENT-LVL IV: CPT | Mod: PBBFAC,,, | Performed by: FAMILY MEDICINE

## 2020-08-31 PROCEDURE — 1159F MED LIST DOCD IN RCRD: CPT | Mod: S$GLB,,, | Performed by: FAMILY MEDICINE

## 2020-08-31 PROCEDURE — U0002 COVID-19 LAB TEST NON-CDC: HCPCS

## 2020-08-31 PROCEDURE — 71046 X-RAY EXAM CHEST 2 VIEWS: CPT | Mod: 26,,, | Performed by: RADIOLOGY

## 2020-08-31 PROCEDURE — 1101F PT FALLS ASSESS-DOCD LE1/YR: CPT | Mod: CPTII,S$GLB,, | Performed by: FAMILY MEDICINE

## 2020-08-31 PROCEDURE — 25500020 PHARM REV CODE 255: Performed by: EMERGENCY MEDICINE

## 2020-08-31 PROCEDURE — 93010 EKG 12-LEAD: ICD-10-PCS | Mod: ,,, | Performed by: INTERNAL MEDICINE

## 2020-08-31 PROCEDURE — 93005 ELECTROCARDIOGRAM TRACING: CPT | Mod: PO

## 2020-08-31 PROCEDURE — 71046 XR CHEST PA AND LATERAL: ICD-10-PCS | Mod: 26,,, | Performed by: RADIOLOGY

## 2020-08-31 PROCEDURE — 99999 PR PBB SHADOW E&M-EST. PATIENT-LVL IV: ICD-10-PCS | Mod: PBBFAC,,, | Performed by: INTERNAL MEDICINE

## 2020-08-31 PROCEDURE — 1126F PR PAIN SEVERITY QUANTIFIED, NO PAIN PRESENT: ICD-10-PCS | Mod: S$GLB,,, | Performed by: INTERNAL MEDICINE

## 2020-08-31 PROCEDURE — 99205 OFFICE O/P NEW HI 60 MIN: CPT | Mod: S$GLB,,, | Performed by: INTERNAL MEDICINE

## 2020-08-31 PROCEDURE — 96361 HYDRATE IV INFUSION ADD-ON: CPT

## 2020-08-31 PROCEDURE — 99999 PR PBB SHADOW E&M-EST. PATIENT-LVL IV: CPT | Mod: PBBFAC,,, | Performed by: INTERNAL MEDICINE

## 2020-08-31 PROCEDURE — 99291 CRITICAL CARE FIRST HOUR: CPT

## 2020-08-31 PROCEDURE — 25000003 PHARM REV CODE 250: Performed by: EMERGENCY MEDICINE

## 2020-08-31 PROCEDURE — 1159F MED LIST DOCD IN RCRD: CPT | Mod: S$GLB,,, | Performed by: INTERNAL MEDICINE

## 2020-08-31 PROCEDURE — 1126F PR PAIN SEVERITY QUANTIFIED, NO PAIN PRESENT: ICD-10-PCS | Mod: S$GLB,,, | Performed by: FAMILY MEDICINE

## 2020-08-31 PROCEDURE — 1101F PT FALLS ASSESS-DOCD LE1/YR: CPT | Mod: CPTII,S$GLB,, | Performed by: INTERNAL MEDICINE

## 2020-08-31 PROCEDURE — 1101F PR PT FALLS ASSESS DOC 0-1 FALLS W/OUT INJ PAST YR: ICD-10-PCS | Mod: CPTII,S$GLB,, | Performed by: FAMILY MEDICINE

## 2020-08-31 RX ORDER — FUROSEMIDE 20 MG/1
20 TABLET ORAL 2 TIMES DAILY
Qty: 10 TABLET | Refills: 0 | Status: SHIPPED | OUTPATIENT
Start: 2020-08-31 | End: 2020-09-05

## 2020-08-31 RX ORDER — ACETAMINOPHEN 500 MG
1000 TABLET ORAL
Status: ACTIVE | OUTPATIENT
Start: 2020-08-31 | End: 2020-09-01

## 2020-08-31 RX ORDER — ASPIRIN 81 MG/1
81 TABLET ORAL DAILY
Qty: 30 TABLET | Refills: 0 | Status: SHIPPED | OUTPATIENT
Start: 2020-08-31 | End: 2021-08-31

## 2020-08-31 RX ORDER — IBUPROFEN 200 MG
24 TABLET ORAL
Status: DISCONTINUED | OUTPATIENT
Start: 2020-08-31 | End: 2020-09-01 | Stop reason: HOSPADM

## 2020-08-31 RX ORDER — ENOXAPARIN SODIUM 100 MG/ML
40 INJECTION SUBCUTANEOUS EVERY 24 HOURS
Status: DISCONTINUED | OUTPATIENT
Start: 2020-08-31 | End: 2020-09-01 | Stop reason: HOSPADM

## 2020-08-31 RX ORDER — SODIUM CHLORIDE 0.9 % (FLUSH) 0.9 %
10 SYRINGE (ML) INJECTION
Status: DISCONTINUED | OUTPATIENT
Start: 2020-08-31 | End: 2020-09-01 | Stop reason: HOSPADM

## 2020-08-31 RX ORDER — CARVEDILOL 12.5 MG/1
12.5 TABLET ORAL 2 TIMES DAILY WITH MEALS
Qty: 60 TABLET | Refills: 3 | Status: SHIPPED | OUTPATIENT
Start: 2020-08-31

## 2020-08-31 RX ORDER — ACETAMINOPHEN 500 MG
500 TABLET ORAL EVERY 6 HOURS PRN
COMMUNITY

## 2020-08-31 RX ORDER — ASPIRIN 81 MG/1
81 TABLET ORAL DAILY
Status: DISCONTINUED | OUTPATIENT
Start: 2020-09-01 | End: 2020-09-01 | Stop reason: HOSPADM

## 2020-08-31 RX ORDER — CARVEDILOL 6.25 MG/1
6.25 TABLET ORAL 2 TIMES DAILY WITH MEALS
COMMUNITY
End: 2020-08-31 | Stop reason: SDUPTHER

## 2020-08-31 RX ORDER — METOPROLOL TARTRATE 1 MG/ML
5 INJECTION, SOLUTION INTRAVENOUS
Status: COMPLETED | OUTPATIENT
Start: 2020-08-31 | End: 2020-08-31

## 2020-08-31 RX ORDER — GLUCAGON 1 MG
1 KIT INJECTION
Status: DISCONTINUED | OUTPATIENT
Start: 2020-08-31 | End: 2020-09-01 | Stop reason: HOSPADM

## 2020-08-31 RX ORDER — HYDRALAZINE HYDROCHLORIDE 20 MG/ML
10 INJECTION INTRAMUSCULAR; INTRAVENOUS EVERY 8 HOURS PRN
Status: DISCONTINUED | OUTPATIENT
Start: 2020-08-31 | End: 2020-09-01 | Stop reason: HOSPADM

## 2020-08-31 RX ORDER — CARVEDILOL 12.5 MG/1
12.5 TABLET ORAL 2 TIMES DAILY WITH MEALS
Status: DISCONTINUED | OUTPATIENT
Start: 2020-09-01 | End: 2020-09-01 | Stop reason: HOSPADM

## 2020-08-31 RX ORDER — IBUPROFEN 200 MG
16 TABLET ORAL
Status: DISCONTINUED | OUTPATIENT
Start: 2020-08-31 | End: 2020-09-01 | Stop reason: HOSPADM

## 2020-08-31 RX ORDER — BUDESONIDE AND FORMOTEROL FUMARATE DIHYDRATE 160; 4.5 UG/1; UG/1
2 AEROSOL RESPIRATORY (INHALATION) 2 TIMES DAILY
COMMUNITY
Start: 2020-07-08

## 2020-08-31 RX ADMIN — METOROPROLOL TARTRATE 5 MG: 5 INJECTION, SOLUTION INTRAVENOUS at 09:08

## 2020-08-31 RX ADMIN — SODIUM CHLORIDE 500 ML: 0.9 INJECTION, SOLUTION INTRAVENOUS at 07:08

## 2020-08-31 RX ADMIN — IOHEXOL 100 ML: 350 INJECTION, SOLUTION INTRAVENOUS at 07:08

## 2020-08-31 NOTE — PROGRESS NOTES
Subjective:   Patient ID:  Samanta Charlton is a 87 y.o. female who presents for cardiac consult of [facility]    Referring Physician: Rudolph Law MD   Reason for consult: COUCH    HPI  The patient came in today for cardiac consult of [facility]    8/31/20  Samanta Charlton is a 87 y.o. female pt with HTN, HLD, h/o CVA, CKD, former smoker presents for CV eval of COUCH.     She saw Dr. Law today - Pt has Chronic COUCH, worsening was seeing Dr. Fernandez. Unknown cardiac history - vague.  ECG today with LBBB, no prior. She was in hospital for fluid in lungs, had CV workup was told neg. She has more COUCH, prior smoker.     Patient feels no chest pain, no leg swelling, no PND, no palpitation, no dizziness, no syncope, no CNS symptoms.    Patient has dec exercise tolerance.    Patient is compliant with medications.    ECG - LBBB    Past Medical History:   Diagnosis Date    Acute ischemic stroke     Allergic rhinitis     HTN (hypertension)     Hyperlipidemia     Osteoporosis, unspecified     Sinusitis        Past Surgical History:   Procedure Laterality Date    HYSTERECTOMY         Social History     Tobacco Use    Smoking status: Former Smoker    Smokeless tobacco: Never Used   Substance Use Topics    Alcohol use: No     Alcohol/week: 0.0 standard drinks    Drug use: No       History reviewed. No pertinent family history.    Patient's Medications   New Prescriptions    ASPIRIN (ECOTRIN) 81 MG EC TABLET    Take 1 tablet (81 mg total) by mouth once daily.   Previous Medications    ACETAMINOPHEN (TYLENOL) 500 MG TABLET    Take 500 mg by mouth every 6 (six) hours as needed for Pain.    B COMPLEX VITAMINS TABLET    Take 1 tablet by mouth once daily.    CALCIUM-VITAMIN D3 500 MG(1,250MG) -200 UNIT PER TABLET    Take 1 tablet by mouth 2 (two) times daily with meals.    FISH OIL-OMEGA-3 FATTY ACIDS 300-1,000 MG CAPSULE    Take by mouth once daily.    FUROSEMIDE (LASIX) 20 MG TABLET    Take 1 tablet (20 mg  "total) by mouth 2 (two) times daily. for 5 days    MAGNESIUM 30 MG TAB    Take by mouth once.    MULTIVITAMIN WITH MINERALS TABLET    Take 1 tablet by mouth once daily.    SYMBICORT 160-4.5 MCG/ACTUATION HFAA    Inhale 2 puffs into the lungs 2 (two) times daily.    TURMERIC ROOT EXTRACT 500 MG CAP    Take by mouth.   Modified Medications    Modified Medication Previous Medication    CARVEDILOL (COREG) 12.5 MG TABLET carvediloL (COREG) 6.25 MG tablet       Take 1 tablet (12.5 mg total) by mouth 2 (two) times daily with meals.    Take 6.25 mg by mouth 2 (two) times daily with meals.   Discontinued Medications    No medications on file       Review of Systems   Constitutional: Positive for malaise/fatigue.   HENT: Negative.    Eyes: Negative.    Respiratory: Positive for shortness of breath.    Cardiovascular: Negative.    Gastrointestinal: Negative.    Genitourinary: Negative.    Musculoskeletal: Negative.    Skin: Negative.    Neurological: Negative.    Endo/Heme/Allergies: Negative.    Psychiatric/Behavioral: Negative.    All 12 systems otherwise negative.      Wt Readings from Last 3 Encounters:   08/31/20 63.9 kg (140 lb 14 oz)   08/31/20 63.9 kg (140 lb 14 oz)   06/27/18 72.6 kg (160 lb 0.9 oz)     Temp Readings from Last 3 Encounters:   08/31/20 97.9 °F (36.6 °C) (Temporal)   05/23/18 98.9 °F (37.2 °C) (Tympanic)   05/15/17 97.8 °F (36.6 °C)     BP Readings from Last 3 Encounters:   08/31/20 (!) 172/70   08/31/20 (!) 152/98   06/27/18 (!) 149/75     Pulse Readings from Last 3 Encounters:   08/31/20 70   08/31/20 80   06/27/18 77       BP (!) 172/70 (BP Location: Left arm, Patient Position: Sitting, BP Method: Large (Manual))   Pulse 70   Ht 5' 4" (1.626 m)   Wt 63.9 kg (140 lb 14 oz)   SpO2 95%   BMI 24.18 kg/m²     Objective:   Physical Exam   Constitutional: She is oriented to person, place, and time. She appears well-developed and well-nourished. No distress.   HENT:   Head: Normocephalic and atraumatic. "   Nose: Nose normal.   Mouth/Throat: Oropharynx is clear and moist.   Eyes: Conjunctivae and EOM are normal. No scleral icterus.   Neck: Normal range of motion. Neck supple. No JVD present. No thyromegaly present.   Cardiovascular: Normal rate, regular rhythm, S1 normal and S2 normal. Exam reveals no gallop, no S3, no S4 and no friction rub.   Murmur heard.  Pulmonary/Chest: Effort normal and breath sounds normal. No stridor. No respiratory distress. She has no wheezes. She has no rales. She exhibits no tenderness.   Abdominal: Soft. Bowel sounds are normal. She exhibits no distension and no mass. There is no abdominal tenderness. There is no rebound.   Genitourinary:    Genitourinary Comments: Deferred     Musculoskeletal: Normal range of motion.         General: No tenderness, deformity or edema.   Lymphadenopathy:     She has no cervical adenopathy.   Neurological: She is alert and oriented to person, place, and time. She exhibits normal muscle tone. Coordination normal.   Skin: Skin is warm and dry. No rash noted. She is not diaphoretic. No erythema. No pallor.   Psychiatric: She has a normal mood and affect. Her behavior is normal. Judgment and thought content normal.   Nursing note and vitals reviewed.      Lab Results   Component Value Date     08/31/2020    K 4.7 08/31/2020     08/31/2020    CO2 29 08/31/2020    BUN 23 08/31/2020    CREATININE 1.1 08/31/2020    GLU 99 08/31/2020    MG 2.3 08/31/2020    AST 33 08/31/2020    ALT 27 08/31/2020    ALBUMIN 4.2 08/31/2020    PROT 7.4 08/31/2020    BILITOT 0.4 08/31/2020    WBC 7.93 08/31/2020    HGB 15.4 08/31/2020    HCT 46.6 08/31/2020    MCV 97 08/31/2020     08/31/2020    TSH 1.843 05/10/2017    CHOL 216 (H) 05/15/2018    HDL 56 05/15/2018    LDLCALC 142.2 05/15/2018    TRIG 89 05/15/2018     Assessment:      1. COUCH (dyspnea on exertion)    2. History of stroke    3. Essential hypertension    4. Other hyperlipidemia    5. Stage 3 chronic  kidney disease    6. LBBB (left bundle branch block)    7. Chest pain, unspecified type        Plan:   1. COUCH with LBBB on ECG  - order 2D ECHO echo  - Pharm nuclear stress test to r/o ischemia, pt cannot walk on treadmill due to dyspnea  - pulm eval  - further eval after testing    2. H/o CVA  - rec asa, statin  - f/u neuro    3. HTN  - cont meds  - increase Coreg    4.CKD  - cont to monitor    5. HLD  - rec statin    ER eval if severe CP/SOB    Thank you for allowing me to participate in this patient's care. Please do not hesitate to contact me with any questions or concerns. Consult note has been forwarded to the referral physician.

## 2020-08-31 NOTE — ED NOTES
Patient placed on continuous cardiac monitor, automatic blood pressure cuff and continuous pulse oximeter.     Shwetha Jane, Patient Care Assistant  08/31/20 [id number]

## 2020-08-31 NOTE — TELEPHONE ENCOUNTER
Spoke with patient daughter informed her that patient d-dimer was positive. Pt daughter expressed clear understanding and states that they will proceed to ER for further evaluation.

## 2020-08-31 NOTE — PROGRESS NOTES
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 Please let me know.  Keep appt and f/u with Dr. Teague

## 2020-08-31 NOTE — TELEPHONE ENCOUNTER
Spoke with patient daughter. Pt daughter questioning CKD stage 3. Informed patient that Dx was added in 2019 from a hospital visit at [facility]. Pt daughter expressed clear understanding.

## 2020-08-31 NOTE — LETTER
August 31, 2020      Rudolph Law MD  [id number] [address] [address]           [facility]  [address] [facility] [address]  Phone: [phone]  Fax: [phone]          Patient: Samanta Charlton   MR Number: [id number]   YOB: 1933   Date of Visit: 8/31/2020       Dear Dr. Rudolph Law:    Thank you for referring Samanta Charlton to me for evaluation. Attached you will find relevant portions of my assessment and plan of care.    If you have questions, please do not hesitate to call me. I look forward to following Samanta Charlton along with you.    Sincerely,    Joe Teague MD    Enclosure  CC:  No Recipients    If you would like to receive this communication electronically, please contact [email or web address] or [phone] to request more information on [employer] Link access.    For providers and/or their staff who would like to refer a patient to [facility], please contact us through our one-stop-shop provider referral line, [facility], at [phone].    If you feel you have received this communication in error or would no longer like to receive these types of communications, please e-mail [email or web address]

## 2020-08-31 NOTE — PROGRESS NOTES
Subjective:       Patient ID: Samanta Charlton is a 87 y.o. female.    Chief Complaint: Shortness of Breath (w/ difficulty breathing since 4/1. )    Changing to me today as PCP    History taken from patient and daughter    Shortness of Breath  This is a chronic problem. The current episode started more than 1 year ago. The problem occurs constantly. The problem has been gradually worsening. Pertinent negatives include no abdominal pain, chest pain, fever, hemoptysis, rash, vomiting or wheezing. Nothing aggravates the symptoms. Treatments tried: symbicort. The treatment provided moderate relief.     Review of Systems   Constitutional: Negative for activity change and fever.   HENT: Negative for congestion.    Eyes: Negative for discharge.   Respiratory: Positive for cough and shortness of breath. Negative for hemoptysis and wheezing.    Cardiovascular: Negative for chest pain and palpitations.   Gastrointestinal: Negative for abdominal pain and vomiting.   Genitourinary: Negative for difficulty urinating.   Musculoskeletal: Negative for joint swelling.   Skin: Negative for rash.   Neurological: Negative for dizziness.   Psychiatric/Behavioral: The patient is not nervous/anxious.        Objective:      Physical Exam  Vitals signs and nursing note reviewed.   Constitutional:       General: She is not in acute distress.     Appearance: Normal appearance. She is well-developed. She is not diaphoretic.   HENT:      Head: Normocephalic and atraumatic.   Eyes:      General: No scleral icterus.     Conjunctiva/sclera: Conjunctivae normal.   Cardiovascular:      Rate and Rhythm: Normal rate and regular rhythm.      Pulses: Normal pulses.      Heart sounds: Murmur present.   Pulmonary:      Effort: No respiratory distress.      Breath sounds: No wheezing.      Comments: Decreased insp flow    Abdominal:      General: Bowel sounds are normal.      Palpations: Abdomen is soft.      Tenderness: There is no abdominal tenderness.  There is no guarding.   Musculoskeletal:      Right lower leg: No edema.      Left lower leg: No edema.   Skin:     General: Skin is warm.      Coloration: Skin is not pale.      Findings: No erythema or rash.      Comments: Good turgor   Neurological:      Mental Status: She is alert.         Assessment:       1. COUCH (dyspnea on exertion)    2. Hyperlipidemia, unspecified hyperlipidemia type    3. Stage 3 chronic kidney disease    4. Essential hypertension    5. Cough    6. Encounter for antibody response examination        Plan:     Problem List Items Addressed This Visit        Cardiac/Vascular    Hyperlipidemia    Relevant Orders    X-Ray Chest PA And Lateral    Ambulatory referral/consult to Cardiology    Essential hypertension    Relevant Medications    furosemide (LASIX) 20 MG tablet    Other Relevant Orders    X-Ray Chest PA And Lateral    Ambulatory referral/consult to Cardiology       Renal/    Stage 3 chronic kidney disease       Other    COUCH (dyspnea on exertion) - Primary    Current Assessment & Plan     Chronic COUCH, worsening   was seeing Dr. Fernandez.  Unknown cardiac history - vague    Spoke with Dr. Teague regarding pt situation.  He is willing to see her today         Relevant Medications    furosemide (LASIX) 20 MG tablet    Other Relevant Orders    Ambulatory referral/consult to Cardiology    EKG 12-lead    Magnesium    Brain Natriuretic Peptide    COVID-19 (SARS CoV-2) IgG Antibody    D dimer, quantitative    CK    Comprehensive metabolic panel    CBC auto differential      Other Visit Diagnoses     Cough        Encounter for antibody response examination        Relevant Orders    COVID-19 (SARS CoV-2) IgG Antibody

## 2020-08-31 NOTE — ED PROVIDER NOTES
SCRIBE #1 NOTE: I, Prashanth Pizarro, am scribing for, and in the presence of, Tod Scott Do, MD. I have scribed the entire note.       History     Chief Complaint   Patient presents with    Shortness of Breath     Sent by PCP for elevated D-dimer.     Review of patient's allergies indicates:   Allergen Reactions    Ace inhibitors Nausea And Vomiting    Amlodipine Edema    Codeine      Other reaction(s): Hallucinations    Hydrochlorothiazide Nausea And Vomiting    Sulfa (sulfonamide antibiotics) Nausea And Vomiting    Benicar  [olmesartan] Rash         History of Present Illness     HPI    8/31/2020, 6:06 PM  History obtained from the patient      History of Present Illness: Samanta Charlton is a 87 y.o. female patient with a PMHx of HTN, HLD who presents to the Emergency Department for evaluation of SOB which onset a few days ago. Pt was seen by her PCP earlier today for the difficulty breathing. PCP sent her here due to elevated D-dimer. Symptoms are intermittent and moderate in severity. SOB is exacerbated with exertion. Associated sxs include Left shoulder soreness. Patient denies any CP, fever, chills, N/V/D, congestion, and all other sxs at this time. No prior Tx reported. No further complaints or concerns at this time.       Arrival mode: Personal vehicle    PCP: Rudolph Law MD        Past Medical History:  Past Medical History:   Diagnosis Date    Acute ischemic stroke     Allergic rhinitis     HTN (hypertension)     Hyperlipidemia     Osteoporosis, unspecified     Sinusitis        Past Surgical History:  Past Surgical History:   Procedure Laterality Date    HYSTERECTOMY           Family History:  History reviewed. No pertinent family history.    Social History:  Social History     Tobacco Use    Smoking status: Former Smoker    Smokeless tobacco: Never Used   Substance and Sexual Activity    Alcohol use: No     Alcohol/week: 0.0 standard drinks    Drug use: No    Sexual activity:  Never        Review of Systems     Review of Systems   Constitutional: Negative for activity change, appetite change, chills, diaphoresis and fever.        (+) abnormal labs   HENT: Negative for congestion, drooling, ear pain, mouth sores, rhinorrhea, sinus pain, sore throat and trouble swallowing.    Eyes: Negative for pain and discharge.   Respiratory: Positive for shortness of breath. Negative for cough, chest tightness, wheezing and stridor.    Cardiovascular: Negative for chest pain, palpitations and leg swelling.   Gastrointestinal: Negative for abdominal distention, abdominal pain, blood in stool, constipation, diarrhea, nausea and vomiting.   Genitourinary: Negative for difficulty urinating, dysuria, flank pain, frequency, hematuria and urgency.   Musculoskeletal: Positive for arthralgias (R shoulder). Negative for back pain and myalgias.   Skin: Negative for pallor, rash and wound.   Neurological: Negative for dizziness, syncope, weakness, light-headedness and numbness.   All other systems reviewed and are negative.       Physical Exam     Initial Vitals [08/31/20 1757]   BP Pulse Resp Temp SpO2   (!) 197/82 93 (!) 22 97.7 °F (36.5 °C) (!) 92 %      MAP       --          Physical Exam  Nursing Notes and Vital Signs Reviewed.  Constitutional: Patient is in no acute distress. Elderly.  Head: Atraumatic. Normocephalic.  Eyes: PERRL. EOM intact.  ENT: Mucous membranes are moist. Oropharynx is clear and symmetric.    Neck: Supple. Full ROM. No lymphadenopathy.  Cardiovascular: Regular rate. Regular rhythm. No murmurs, rubs, or gallops. Distal pulses are 2+ and symmetric.  Pulmonary/Chest: No respiratory distress. Clear to auscultation bilaterally. No wheezing or rales.  Abdominal: Soft and non-distended.  There is no tenderness.  No rebound, guarding, or rigidity. Good bowel sounds.  Genitourinary: No CVA tenderness  Musculoskeletal: Moves all extremities. No obvious deformities. No edema. No calf  "tenderness.  Skin: Warm and dry.  Neurological:  Alert, awake, and appropriate.  Normal speech.  No acute focal neurological deficits are appreciated.  Psychiatric: Normal affect. Good eye contact. Appropriate in content.     ED Course   Critical Care    Date/Time: 8/31/2020 8:58 PM  Performed by: Tod Scott Do, MD  Authorized by: Tod Scott Do, MD   Direct patient critical care time: 14 minutes  Additional history critical care time: 10 minutes  Ordering / reviewing critical care time: 5 minutes  Documentation critical care time: 9 minutes  Consulting other physicians critical care time: 4 minutes  Total critical care time (exclusive of procedural time) : 42 minutes  Critical care time was exclusive of separately billable procedures and treating other patients and teaching time.  Critical care was necessary to treat or prevent imminent or life-threatening deterioration of the following conditions: hypoxia, HTN.  Critical care was time spent personally by me on the following activities: blood draw for specimens, development of treatment plan with patient or surrogate, discussions with consultants, interpretation of cardiac output measurements, evaluation of patient's response to treatment, examination of patient, obtaining history from patient or surrogate, ordering and performing treatments and interventions, ordering and review of laboratory studies, ordering and review of radiographic studies, pulse oximetry, re-evaluation of patient's condition, review of old charts and transcutaneous pacing.        ED Vital Signs:  Vitals:    08/31/20 1757 08/31/20 1810 08/31/20 2030 08/31/20 2100   BP: (!) 197/82 (!) 184/89 (!) 229/103 (!) 199/113   Pulse: 93 86 (!) 115 107   Resp: (!) 22 (!) 22 (!) 29 (!) 23   Temp: 97.7 °F (36.5 °C)      TempSrc: Oral      SpO2: (!) 92% 95% (!) 93% (!) 89%   Weight: 64.1 kg (141 lb 6.8 oz)      Height: 5' 4" (1.626 m)       08/31/20 2130 08/31/20 2200 08/31/20 2315   BP: (!) 189/120 " (!) 186/81 (!) 161/75   Pulse: 105 86 82   Resp: (!) 24 (!) 24 (!) 22   Temp:      TempSrc:      SpO2: (!) 90% (!) 90% (!) 93%   Weight:      Height:          Abnormal Lab Results:  Labs Reviewed   SARS-COV-2 RNA AMPLIFICATION, QUAL   TROPONIN I        All Lab Results:  Results for orders placed or performed during the hospital encounter of 08/31/20   COVID-19 Rapid Screening   Result Value Ref Range    SARS-CoV-2 RNA, Amplification, Qual Negative Negative   Troponin I   Result Value Ref Range    Troponin I 0.017 0.000 - 0.026 ng/mL       Results for orders placed or performed during the hospital encounter of 08/31/20   COVID-19 Rapid Screening   Result Value Ref Range    SARS-CoV-2 RNA, Amplification, Qual Negative Negative   Troponin I   Result Value Ref Range    Troponin I 0.017 0.000 - 0.026 ng/mL       Imaging Results:  Imaging Results          CTA Chest Non-Coronary - PE Study (Final result)  Result time 08/31/20 19:40:02    Final result by JELANI Armenta Sr., MD (08/31/20 19:40:02)                 Impression:      1. There is matted lymphadenopathy in the mediastinum and right perihilar region.  There is an enlarged lymph node in the left axilla. It has a short axis measurement of 11 mm.  There are poorly visualized hypodense lesions scattered throughout the liver.  This is worrisome for metastatic disease. If additional imaging evaluation is clinically indicated, I recommend consideration of a PET-CT examination.  2. This is a limited examination secondary to patient motion. There is no large or central pulmonary embolus. A small or peripheral pulmonary embolus cannot be excluded secondary to the motion artifact.  3. There has been interval development of a streaky opacity in the anterior aspect of the right lower lobe.  This is characteristic of subsegmental atelectasis or scarring.  4. There are mild emphysematous changes in both lungs.  5. There has been interval development of a tiny right pleural  effusion.  All CT scans at this facility use dose modulation, iterative reconstruction, and/or weight base dosing when appropriate to reduce radiation dose when appropriate to reduce radiation dose to as low as reasonably achievable.      Electronically signed by: Jim Armenta MD  Date:    08/31/2020  Time:    19:40             Narrative:    EXAMINATION:  CTA CHEST NON CORONARY    CLINICAL HISTORY:  PE suspected, intermediate prob, positive D-dimer;    TECHNIQUE:  Standard chest CT protocol was performed with IV contrast and 3D MIP reformats.  100 mL of Omnipaque 350 contrast material was used for this examination.    COMPARISON:  A CT examination of the thorax performed on 03/31/2010.    FINDINGS:  This is a limited examination secondary to patient motion.  There is no large or central pulmonary embolus.  A small or peripheral pulmonary embolus cannot be excluded secondary to the motion artifact.  The size of heart is within normal limits.  There is matted lymphadenopathy in the mediastinum and right perihilar region.  There is an enlarged lymph node in the left axilla.  It has a short axis measurement of 11 mm.  There is a moderate amount of atherosclerosis.  There is a 4 mm calcified granuloma in the right lower lobe.  There has been interval development of a streaky opacity in the anterior aspect of the right lower lobe.  There is no evidence of an acute pulmonary process in the left lung.  There are mild emphysematous changes in both lungs.  There has been interval development of a tiny right pleural effusion.  There is no pneumothorax.  There are poorly visualized hypodense lesions scattered throughout the liver.                               The EKG was ordered, reviewed, and independently interpreted by the ED provider.  Interpretation time: 14:28  Rate: 72 BPM  Rhythm: normal sinus rhythm  Interpretation: LBBB. No STEMI.  No previous ECGs available.    The EKG was ordered, reviewed, and independently  interpreted by the ED provider.  Interpretation time: 21:10  Rate: 105 BPM  Rhythm: sinus tachycardia  Interpretation: LBBB. No STEMI.  Non acute changes except sinus tachycardia.             The Emergency Provider reviewed the vital signs and test results, which are outlined above.     ED Discussion       8:31 PM: Discussed pt's case with Dr. Sterling (Heatology & Onology).    8:36 PM: Re-evaluated pt. Pt's pulse ox dropped to 86% with good waveform when sitting up, now at 89%. Will put on 2 L O2. HR also increased to 130s when sitting up. Pt and family agree to stay in the hospital. Dr. Sterling agrees with admission and states she may need echo, r/o MI.    8:54 PM: Discussed case with Claudia Mock NP (Hospital Medicine). Dr. Dao agrees with current care and management of pt and accepts admission.   Admitting Service: Hospital Medicine  Admitting Physician: Dr. Dao  Admit to: Obs med tele     8:57 PM: Re-evaluated pt. I have discussed test results, shared treatment plan, and the need for admission with patient and family at bedside. Pt and family express understanding at this time and agree with all information. All questions answered. Pt and family have no further questions or concerns at this time. Pt is ready for admit.           Medical Decision Making:   Clinical Tests:   Lab Tests: Ordered and Reviewed  Radiological Study: Ordered and Reviewed  Medical Tests: Ordered and Reviewed           ED Medication(s):  Medications   acetaminophen tablet 1,000 mg (1,000 mg Oral Not Given 8/31/20 1815)   sodium chloride 0.9% flush 10 mL (has no administration in time range)   glucose chewable tablet 16 g (has no administration in time range)   glucose chewable tablet 24 g (has no administration in time range)   dextrose 50% injection 12.5 g (has no administration in time range)   dextrose 50% injection 25 g (has no administration in time range)   glucagon (human recombinant) injection 1 mg (has no administration in time  range)   aspirin EC tablet 81 mg (has no administration in time range)   carvediloL tablet 12.5 mg (has no administration in time range)   hydrALAZINE injection 10 mg (has no administration in time range)   enoxaparin injection 40 mg (has no administration in time range)   sodium chloride 0.9% bolus 500 mL (0 mLs Intravenous Stopped 8/31/20 2209)   iohexoL (OMNIPAQUE 350) injection 100 mL (100 mLs Intravenous Given 8/31/20 1922)   metoprolol injection 5 mg (5 mg Intravenous Given 8/31/20 2143)       New Prescriptions    No medications on file               Scribe Attestation:   Scribe #1: I performed the above scribed service and the documentation accurately describes the services I performed. I attest to the accuracy of the note.     Attending:   Physician Attestation Statement for Scribe #1: I, Tod Scott Do, MD, personally performed the services described in this documentation, as scribed by Prashanth Pizarro, in my presence, and it is both accurate and complete.           Clinical Impression       ICD-10-CM ICD-9-CM   1. Dyspnea, unspecified type  R06.00 786.09   2. Tachycardia  R00.0 785.0   3. Lesion of liver  K76.9 573.8       Disposition:   Disposition: Placed in Observation  Condition: Fair         Tod Scott Do, MD  09/01/20 0006

## 2020-08-31 NOTE — ASSESSMENT & PLAN NOTE
Chronic COUCH, worsening   was seeing Dr. Fernandez.  Unknown cardiac history - vague    Spoke with Dr. Teague regarding pt situation.  He is willing to see her today

## 2020-08-31 NOTE — TELEPHONE ENCOUNTER
----- Message from Deacon Traylor sent at 8/31/2020  3:43 PM CDT -----  Contact: Pau  Would like to consult with nurse regarding recent paper work and diagnosis.  Pt's daughter states that she may have the wrong diagnosis.  Please contact Pau (daughter) [phone].  Thanks/As

## 2020-09-01 VITALS
DIASTOLIC BLOOD PRESSURE: 72 MMHG | HEIGHT: 64 IN | WEIGHT: 217.63 LBS | SYSTOLIC BLOOD PRESSURE: 142 MMHG | TEMPERATURE: 98 F | HEART RATE: 71 BPM | RESPIRATION RATE: 16 BRPM | OXYGEN SATURATION: 92 % | BODY MASS INDEX: 37.16 KG/M2

## 2020-09-01 PROBLEM — K76.9 LIVER LESION: Status: ACTIVE | Noted: 2020-09-01

## 2020-09-01 PROBLEM — Z51.5 ENCOUNTER FOR PALLIATIVE CARE: Status: ACTIVE | Noted: 2020-09-01

## 2020-09-01 PROBLEM — R59.0 AXILLARY LYMPHADENOPATHY: Status: ACTIVE | Noted: 2020-09-01

## 2020-09-01 PROCEDURE — 99205 PR OFFICE/OUTPT VISIT, NEW, LEVL V, 60-74 MIN: ICD-10-PCS | Mod: ,,, | Performed by: PHYSICIAN ASSISTANT

## 2020-09-01 PROCEDURE — 86301 IMMUNOASSAY TUMOR CA 19-9: CPT

## 2020-09-01 PROCEDURE — 63600175 PHARM REV CODE 636 W HCPCS: Performed by: INTERNAL MEDICINE

## 2020-09-01 PROCEDURE — 86300 IMMUNOASSAY TUMOR CA 15-3: CPT | Mod: 91

## 2020-09-01 PROCEDURE — 27000221 HC OXYGEN, UP TO 24 HOURS

## 2020-09-01 PROCEDURE — 82378 CARCINOEMBRYONIC ANTIGEN: CPT

## 2020-09-01 PROCEDURE — 99203 OFFICE O/P NEW LOW 30 MIN: CPT | Mod: ,,, | Performed by: INTERNAL MEDICINE

## 2020-09-01 PROCEDURE — 96372 THER/PROPH/DIAG INJ SC/IM: CPT | Mod: 59 | Performed by: EMERGENCY MEDICINE

## 2020-09-01 PROCEDURE — 99900035 HC TECH TIME PER 15 MIN (STAT)

## 2020-09-01 PROCEDURE — G0378 HOSPITAL OBSERVATION PER HR: HCPCS

## 2020-09-01 PROCEDURE — 86300 IMMUNOASSAY TUMOR CA 15-3: CPT

## 2020-09-01 PROCEDURE — 25000242 PHARM REV CODE 250 ALT 637 W/ HCPCS: Performed by: NURSE PRACTITIONER

## 2020-09-01 PROCEDURE — 99203 PR OFFICE/OUTPT VISIT, NEW, LEVL III, 30-44 MIN: ICD-10-PCS | Mod: ,,, | Performed by: INTERNAL MEDICINE

## 2020-09-01 PROCEDURE — 99204 PR OFFICE/OUTPT VISIT, NEW, LEVL IV, 45-59 MIN: ICD-10-PCS | Mod: ,,, | Performed by: INTERNAL MEDICINE

## 2020-09-01 PROCEDURE — 99497 ADVNCD CARE PLAN 30 MIN: CPT | Mod: ,,, | Performed by: PHYSICIAN ASSISTANT

## 2020-09-01 PROCEDURE — 25000003 PHARM REV CODE 250: Performed by: INTERNAL MEDICINE

## 2020-09-01 PROCEDURE — 82105 ALPHA-FETOPROTEIN SERUM: CPT

## 2020-09-01 PROCEDURE — 36415 COLL VENOUS BLD VENIPUNCTURE: CPT

## 2020-09-01 PROCEDURE — 99205 OFFICE O/P NEW HI 60 MIN: CPT | Mod: ,,, | Performed by: PHYSICIAN ASSISTANT

## 2020-09-01 PROCEDURE — 99204 OFFICE O/P NEW MOD 45 MIN: CPT | Mod: ,,, | Performed by: INTERNAL MEDICINE

## 2020-09-01 PROCEDURE — 94640 AIRWAY INHALATION TREATMENT: CPT

## 2020-09-01 PROCEDURE — 94761 N-INVAS EAR/PLS OXIMETRY MLT: CPT

## 2020-09-01 PROCEDURE — 63600175 PHARM REV CODE 636 W HCPCS: Performed by: FAMILY MEDICINE

## 2020-09-01 PROCEDURE — 99497 PR ADVNCD CARE PLAN 30 MIN: ICD-10-PCS | Mod: ,,, | Performed by: PHYSICIAN ASSISTANT

## 2020-09-01 RX ORDER — ENOXAPARIN SODIUM 100 MG/ML
40 INJECTION SUBCUTANEOUS
Status: COMPLETED | OUTPATIENT
Start: 2020-09-01 | End: 2020-09-01

## 2020-09-01 RX ORDER — ARFORMOTEROL TARTRATE 15 UG/2ML
15 SOLUTION RESPIRATORY (INHALATION) 2 TIMES DAILY
Status: DISCONTINUED | OUTPATIENT
Start: 2020-09-01 | End: 2020-09-01 | Stop reason: HOSPADM

## 2020-09-01 RX ORDER — BUDESONIDE 0.5 MG/2ML
0.5 INHALANT ORAL EVERY 12 HOURS
Status: DISCONTINUED | OUTPATIENT
Start: 2020-09-01 | End: 2020-09-01 | Stop reason: HOSPADM

## 2020-09-01 RX ADMIN — CARVEDILOL 12.5 MG: 12.5 TABLET, FILM COATED ORAL at 07:09

## 2020-09-01 RX ADMIN — CARVEDILOL 12.5 MG: 12.5 TABLET, FILM COATED ORAL at 04:09

## 2020-09-01 RX ADMIN — ARFORMOTEROL TARTRATE 15 MCG: 15 SOLUTION RESPIRATORY (INHALATION) at 08:09

## 2020-09-01 RX ADMIN — ENOXAPARIN SODIUM 40 MG: 40 INJECTION SUBCUTANEOUS at 04:09

## 2020-09-01 RX ADMIN — ASPIRIN 81 MG: 81 TABLET, COATED ORAL at 08:09

## 2020-09-01 RX ADMIN — BUDESONIDE 0.5 MG: 0.5 SUSPENSION RESPIRATORY (INHALATION) at 08:09

## 2020-09-01 RX ADMIN — ENOXAPARIN SODIUM 40 MG: 100 INJECTION SUBCUTANEOUS at 02:09

## 2020-09-01 NOTE — PLAN OF CARE
Pts choice obtained by Sharon with Palliative Care. Referral sent to [place] [facility]       09/01/20 2107   Post-Acute Status   Post-Acute Authorization Hospice   Hospice Status Referrals Sent

## 2020-09-01 NOTE — CONSULTS
[facility] -   Pulmonology  Consult Note    Patient Name: Samanta Charlton  MRN: [id number]  Admission Date: 8/31/2020  Hospital Length of Stay: 0 days  Code Status: DNR  Attending Physician: Ki Roa MD  Primary Care Provider: Rudolph Law MD   Principal Problem: Acute hypoxemic respiratory failure      Subjective:     HPI:  87-year-old female patient admitted for worsening shortness of breath over the last 3-4 months.  She is known with history of cerebrovascular accident, smoking more than 30 years quit about 8 years ago, has been complaining of worsening shortness of breath rare cough no wheezing no sputum production.  She has also experienced weight loss and loss of appetite.     Past Medical History:   Diagnosis Date    Acute ischemic stroke     Allergic rhinitis     HTN (hypertension)     Hyperlipidemia     Osteoporosis, unspecified     Sinusitis        Past Surgical History:   Procedure Laterality Date    HYSTERECTOMY         Review of patient's allergies indicates:   Allergen Reactions    Ace inhibitors Nausea And Vomiting    Amlodipine Edema    Codeine      Other reaction(s): Hallucinations    Hydrochlorothiazide Nausea And Vomiting    Sulfa (sulfonamide antibiotics) Nausea And Vomiting    Benicar  [olmesartan] Rash       Family History     None        Tobacco Use    Smoking status: Former Smoker    Smokeless tobacco: Never Used   Substance and Sexual Activity    Alcohol use: No     Alcohol/week: 0.0 standard drinks    Drug use: No    Sexual activity: Never         Review of Systems   Constitutional: Positive for activity change, appetite change, fatigue and unexpected weight change.   HENT: Positive for congestion and hearing loss.    Respiratory: Positive for shortness of breath.    Cardiovascular: Negative for chest pain.   Gastrointestinal: Negative for abdominal pain.   Endocrine: Negative for polyphagia.   Genitourinary: Negative for hematuria.   Musculoskeletal:  Positive for arthralgias, back pain, gait problem, myalgias and neck stiffness.   Skin: Positive for pallor.   Allergic/Immunologic: Negative for immunocompromised state.   Neurological: Positive for tremors. Negative for seizures.   Hematological: Positive for adenopathy.   Psychiatric/Behavioral: Negative for behavioral problems.     Objective:     Vital Signs (Most Recent):  Temp: 96.5 °F (35.8 °C) (09/01/20 1133)  Pulse: 68 (09/01/20 1500)  Resp: 19 (09/01/20 1133)  BP: (!) 121/57 (09/01/20 1133)  SpO2: 95 % (09/01/20 1133) Vital Signs (24h Range):  Temp:  [96.5 °F (35.8 °C)-98.3 °F (36.8 °C)] 96.5 °F (35.8 °C)  Pulse:  [] 68  Resp:  [18-29] 19  SpO2:  [89 %-99 %] 95 %  BP: (121-229)/() 121/57     Weight: 98.7 kg (217 lb 9.5 oz)  Body mass index is 37.35 kg/m².    No intake or output data in the 24 hours ending 09/01/20 1511    Physical Exam  Vitals signs and nursing note reviewed.   Constitutional:       General: She is not in acute distress.     Appearance: She is well-developed. She is ill-appearing.   HENT:      Head: Normocephalic and atraumatic.   Neck:      Musculoskeletal: Neck supple.   Cardiovascular:      Rate and Rhythm: Normal rate and regular rhythm.   Pulmonary:      Effort: Pulmonary effort is normal. No respiratory distress.      Breath sounds: No rhonchi.   Abdominal:      Palpations: Abdomen is soft.      Tenderness: There is no abdominal tenderness.   Musculoskeletal:         General: Tenderness present.   Skin:     Coloration: Skin is pale.   Neurological:      Mental Status: She is alert and oriented to person, place, and time.   Psychiatric:         Mood and Affect: Mood normal.         Behavior: Behavior normal.         Thought Content: Thought content normal.         Judgment: Judgment normal.         Vents:  Oxygen Concentration (%): 28 (09/01/20 0840)    Lines/Drains/Airways     Peripheral Intravenous Line                 Peripheral IV - Single Lumen 08/31/20 1831 20 G Left  Antecubital less than 1 day                Significant Labs:    CBC/Anemia Profile:  Recent Labs   Lab 08/31/20  1517   WBC 7.93   HGB 15.4   HCT 46.6      MCV 97   RDW 13.1        Chemistries:  Recent Labs   Lab 08/31/20  1517      K 4.7      CO2 29   BUN 23   CREATININE 1.1   CALCIUM 10.1   ALBUMIN 4.2   PROT 7.4   BILITOT 0.4   ALKPHOS 97   ALT 27   AST 33   MG 2.3     EKG 08/31/2020    Sinus tachycardia   Left bundle branch block      CT chest PE protocol 08/31/2020    This is a limited examination secondary to patient motion.  There is no large or central pulmonary embolus.  A small or peripheral pulmonary embolus cannot be excluded secondary to the motion artifact.  The size of heart is within normal limits.  There is matted lymphadenopathy in the mediastinum and right perihilar region.  There is an enlarged lymph node in the left axilla.  It has a short axis measurement of 11 mm.  There is a moderate amount of atherosclerosis.  There is a 4 mm calcified granuloma in the right lower lobe.  There has been interval development of a streaky opacity in the anterior aspect of the right lower lobe.  There is no evidence of an acute pulmonary process in the left lung.  There are mild emphysematous changes in both lungs.  There has been interval development of a tiny right pleural effusion.  There is no pneumothorax.  There are poorly visualized hypodense lesions scattered throughout the liver.        ABG  No results for input(s): PH, PO2, PCO2, HCO3, BE in the last 168 hours.  Assessment/Plan:     * Acute hypoxemic respiratory failure  Home O2 evaluation prior to discharge.    Liver lesion  Multiple liver lesions on CT scan.  Check tumor markers.  Outpatient oncology follow-up PET scan.    Axillary lymphadenopathy  Check tumor markers.  Outpatient oncology follow-up./PET scan    DNR (do not resuscitate)  Palliative care consult appreciated.  Patient DNR.  Not wishing to proceed with invasive  workup.  Might obtain PET scan as outpatient.  Oncology follow-up.    Abnormal chest CT  Mediastinal lymphadenopathy, matted, with axillary lymphadenopathy and liver lesions.  Suspected malignancy.  Tumor markers sent.  Discussed with patient and daughter, not willing to proceed with invasive workup.  They can follow-up as outpatient.    Dyspnea  History of smoking more than 30 years quit 25 years ago, start DuoNeb.  Outpatient pulmonary follow-up.          Thank you for your consult. I will follow-up with patient. Please contact us if you have any additional questions.     Jovan Mariano MD  Pulmonology  [facility]

## 2020-09-01 NOTE — ASSESSMENT & PLAN NOTE
Oncology was consulted due to results of the CT of the chest, results as follows:  CTA of the chest showed -. There is matted lymphadenopathy in the mediastinum and right perihilar region.  There is an enlarged lymph node in the left axilla. It has a short axis measurement of 11 mm.  There are poorly visualized hypodense lesions scattered throughout the liver.  This is worrisome for metastatic disease.   Patient does have a remote smoking history, family reports patient was at normal functional status until the last few months and have noted a sharp decline in her status during this time.  Lab orders placed:  CEA, AFP, CA 15-3, CA 27-29, CA 19-9.  Discuss this with daughter at bedside, daughter stated patient will likely not want treatment if a cancer diagnosis is confirmed.  Plan is for possible discharge home today, will see patient as outpatient to discuss results.

## 2020-09-01 NOTE — CONSULTS
Advance Care Planning    Consult Note  Palliative Medicine      Consult Requested By: Dr. Dao  Reason for Consult: Goals of care    SUBJECTIVE:     History of Present Illness:  Samanta Charlton is a 87 y.o. year old with a history of HTN, hyperlipidemia, and remove tobacco abuse who presented to the emergency department complaining of dyspnea on exertion. She was referred to cardiology and pulmonology clinic due to the complaint but was referred to the ED following elevated d-dimer. CTA of the chest revealed matted lymphadenopathy in the mediastinum, right perihilar region, left axilla, and hypodense lesions throughout the liver. These findings were concerning for metastatic disease and oncology was consulted. Palliative Medicine was also consulted to discuss goals of care. I met Ms. Charlton in her room with her daughter Pau at bedside. Ms. Charlton was sleeping and her daughter says she has been deferring to her since her hearing aid stopped working as it is difficult to hear the providers. Pau talked about her mother's previous independence and active lifestyle before it abruptly started diminishing over the last few months. Pau says that she and her siblings live very close but their mother lives alone and manages all of her affairs. Pau understands that her mother elected DNR/ DNI on admission and has no intention of receiving chemotherapy. She says that she watched Pau go through chemotherapy for breast cancer and always said she would never agree to this measure. Pau understands that oncology has a lot of pending lab in process and will plan to follow up with PET scan for further staging. Pau was also told that chemotherapy would not be offered but perhaps immunotherapy could be an option. Ms. Charlton woke up while I was in the room so I spoke to her directly. She confirmed the above statements saying that she is 87 years old and does not think that chemotherapy will enrich her life in anyway but  rather make her more ill. She says that she does not have any intention to pursue surgery, biopsy, chemotherapy, and likely not immunotherapy. She is willing to have the PET scan for the peace of mind of metastatic disease but says she would rather focus on quality of life. She does not currently have pain and her dyspnea has resolved with oxygen supplementation. I discussed her option of outpatient Palliative Medicine follow up for symptom management and assistance with hospice referral following her decision with oncology. We also discussed hospice enrollment if she declined or changed her mind about the oncology follow up and PET scan. She and her daughter would feel best meeting with hospice now and having a contact and a plan whenever they make their decision. Ms. Charlton says she is firm in her decision to not pursue cancer targeted therapy but knows her kids would like to hear from the oncologist. We discussed the benefits of ACP documents including LaPOST and HCPOA and she would like to complete those documents today. She has 3 children but names her daughter Pau as primary HCPOA and then daughter Jo as alternate. She has our contact information and knows we will be checking in on her over the next couple weeks.     Update: after Ms. Charlton and Pau met with hospice rep they decided they did not want to delay enrollment and wished to d/c home today with hospice services.     Past Medical History:   Diagnosis Date    Acute ischemic stroke     Allergic rhinitis     HTN (hypertension)     Hyperlipidemia     Osteoporosis, unspecified     Sinusitis      Past Surgical History:   Procedure Laterality Date    HYSTERECTOMY       History reviewed. No pertinent family history.  Social History     Socioeconomic History    Marital status:      Spouse name: Not on file    Number of children: Not on file    Years of education: Not on file    Highest education level: Not on file   Occupational  History    Not on file   Social Needs    Financial resource strain: Not on file    Food insecurity     Worry: Not on file     Inability: Not on file    Transportation needs     Medical: Not on file     Non-medical: Not on file   Tobacco Use    Smoking status: Former Smoker    Smokeless tobacco: Never Used   Substance and Sexual Activity    Alcohol use: No     Alcohol/week: 0.0 standard drinks    Drug use: No    Sexual activity: Never   Lifestyle    Physical activity     Days per week: Not on file     Minutes per session: Not on file    Stress: Not on file   Relationships    Social connections     Talks on phone: Not on file     Gets together: Not on file     Attends Yazidism service: Not on file     Active member of club or organization: Not on file     Attends meetings of clubs or organizations: Not on file     Relationship status: Not on file   Other Topics Concern    Not on file   Social History Narrative    Not on file      Review of patient's allergies indicates:   Allergen Reactions    Ace inhibitors Nausea And Vomiting    Amlodipine Edema    Codeine      Other reaction(s): Hallucinations    Hydrochlorothiazide Nausea And Vomiting    Sulfa (sulfonamide antibiotics) Nausea And Vomiting    Benicar  [olmesartan] Rash       Medications:    Current Facility-Administered Medications:     arformoteroL nebulizer solution 15 mcg, 15 mcg, Nebulization, BID, 15 mcg at 09/01/20 0840 **AND** Inhalation Treatment BID, , , BID, Rebecca Birch NP    aspirin EC tablet 81 mg, 81 mg, Oral, Daily, Nahum Dao MD, 81 mg at 09/01/20 0800    budesonide nebulizer solution 0.5 mg, 0.5 mg, Nebulization, Q12H, Rebecca Birch NP, 0.5 mg at 09/01/20 0844    carvediloL tablet 12.5 mg, 12.5 mg, Oral, BID WM, Nahum Dao MD, 12.5 mg at 09/01/20 0757    dextrose 50% injection 12.5 g, 12.5 g, Intravenous, PRN, Nahum Dao MD    dextrose 50% injection 25 g, 25 g, Intravenous, PRN, Nahum Dao MD    enoxaparin  injection 40 mg, 40 mg, Subcutaneous, Q24H, Nahum Dao MD    glucagon (human recombinant) injection 1 mg, 1 mg, Intramuscular, PRN, Nahum Dao MD    glucose chewable tablet 16 g, 16 g, Oral, PRN, Nahum Dao MD    glucose chewable tablet 24 g, 24 g, Oral, PRN, Nahum Dao MD    hydrALAZINE injection 10 mg, 10 mg, Intravenous, Q8H PRN, Nahum Dao MD    sodium chloride 0.9% flush 10 mL, 10 mL, Intravenous, PRN, Nahum Dao MD    ROS:  Review of Systems   Constitutional: Positive for activity change and appetite change. Negative for fatigue and fever.   HENT: Negative for sore throat and trouble swallowing.    Eyes: Negative for photophobia and visual disturbance.   Respiratory: Negative for cough and shortness of breath.    Cardiovascular: Negative for chest pain and leg swelling.   Gastrointestinal: Negative for abdominal pain, constipation, diarrhea and nausea.   Endocrine: Negative for polydipsia and polyphagia.   Genitourinary: Negative for difficulty urinating and dysuria.   Musculoskeletal: Negative for back pain and gait problem.   Neurological: Positive for weakness. Negative for light-headedness and headaches.   Psychiatric/Behavioral: Negative for confusion and sleep disturbance.       OBJECTIVE:     Physical Exam:  Vitals: Temp: 96.5 °F (35.8 °C) (09/01/20 1133)  Pulse: 64 (09/01/20 1133)  Resp: 19 (09/01/20 1133)  BP: (!) 121/57 (09/01/20 1133)  SpO2: 95 % (09/01/20 1133)    Gen: well-developed, well-nourished, NAD  Head: atraumatic, normocephalic  Eyes: conjuctiva and sclera clear  Ears: hearing grossly intact with no external abnormality, hard of hearing  Mouth: no mucositis, good dentition  Respiratory: CTAB, + wheezing   Heart: RRR  Abdomen: soft, NTTP, nondistended, normoactive BS  Pulses: 2+ in DP  Extremities: no edema, full ROM of all joints  Neurologic: no focal deficits, CN II-XII grossly intact, normal coordination  Skin: no rashes or lesions  Psych: cooperative, normal  mood and affect, normal attention span and concentration    Review of Symptoms    Symptom Assessment (ESAS 0-10 Scale)  Pain:  0  Anxiety:  0  Nausea:  0  Depression:  0  Anorexia:  5  Fatigue:  5  Insomnia:  0  Restlessness:  0  Agitation:  0     CAM / Delirium:  Negative  Constipation:  Negative          Performance Status:  60    ECOG Performance Status Grade:  2 - Ambulates, capable of self care only    Advanced Directives:  Living Will: No    LaPOST:  Yes    Do Not Resuscitate Status:  Yes    Medical Power of : Yes     Agent's Name:  Pau Charlton.  Agent's Contact Number:  [phone]    Decision Making:  Patient answered questions and Family answered questions    Living Arrangements:  Lives alone      Labs:  WBC   Date Value Ref Range Status   08/31/2020 7.93 3.90 - 12.70 K/uL Final     Hemoglobin   Date Value Ref Range Status   08/31/2020 15.4 12.0 - 16.0 g/dL Final     Hematocrit   Date Value Ref Range Status   08/31/2020 46.6 37.0 - 48.5 % Final     Mean Corpuscular Volume   Date Value Ref Range Status   08/31/2020 97 82 - 98 fL Final     Platelets   Date Value Ref Range Status   08/31/2020 266 150 - 350 K/uL Final       BMP  Lab Results   Component Value Date     08/31/2020    K 4.7 08/31/2020     08/31/2020    CO2 29 08/31/2020    BUN 23 08/31/2020    CREATININE 1.1 08/31/2020    CALCIUM 10.1 08/31/2020    ANIONGAP 12 08/31/2020    ESTGFRAFRICA 52 (A) 08/31/2020    EGFRNONAA 45 (A) 08/31/2020       Lab Results   Component Value Date    AST 33 08/31/2020    ALKPHOS 97 08/31/2020    BILITOT 0.4 08/31/2020       Albumin   Date Value Ref Range Status   08/31/2020 4.2 3.5 - 5.2 g/dL Final       Radiology:I have reviewed all pertinent imaging results/findings within the past 24 hours.  - CTA chest 8/31/20:  1. There is matted lymphadenopathy in the mediastinum and right perihilar region.  There is an enlarged lymph node in the left axilla. It has a short axis measurement of 11 mm.  There  are poorly visualized hypodense lesions scattered throughout the liver.  This is worrisome for metastatic disease. If additional imaging evaluation is clinically indicated, I recommend consideration of a PET-CT examination.  2. This is a limited examination secondary to patient motion. There is no large or central pulmonary embolus. A small or peripheral pulmonary embolus cannot be excluded secondary to the motion artifact.  3. There has been interval development of a streaky opacity in the anterior aspect of the right lower lobe.  This is characteristic of subsegmental atelectasis or scarring.  4. There are mild emphysematous changes in both lungs.  5. There has been interval development of a tiny right pleural effusion.    ASSESSMENT   Samanta Charlton is a 87 y.o. year old with a history of HTN, hyperlipidemia, and remove tobacco abuse who presented to the emergency department complaining of dyspnea on exertion. She was referred to cardiology and pulmonology clinic due to the complaint but was referred to the ED following elevated d-dimer. CTA of the chest revealed matted lymphadenopathy in the mediastinum, right perihilar region, left axilla, and hypodense lesions throughout the liver. These findings were concerning for metastatic disease and oncology was consulted. Palliative Medicine was also consulted to discuss goals of care.     PLAN   1. Encounter for Palliative Care  - Code status: DNR/ DNI  - HCPOA: narinder Charlton ([phone]), alternate: narinder Mcmullen ([phone])  - HCPOA and Alea completed and on file  - Details of family meeting in [facility]  - Primary outcome of meeting is to meet with hospice and plan to follow up with oncology for lab report and +/- PET scan. Patient plans to enroll in hospice if she declines or changes her mind about oncology follow up. If she does wish to pursue immunotx we will arrange PM follow up to monitor symptoms and provide support.  ** Update: after Ms.  Zoltan and Pau met with hospice rep they decided they did not want to delay enrollment and wished to d/c home today with hospice services.     2. Lymphadenopathy and liver lesions  - Worrisome for metastatic disease  - Cancer labs pending and plans to follow up with oncology outpatient    3. Dyspnea on exertion  - Likely related to #2  - Resolved with supplemental oxygen    4. CKD stage III  - Baseline Cr 0.9-1.0    Discussed case and visit details with Dr. Roa and Dr. TIRSO Ferreira.    Thank you for allowing Palliative Medicine to be involved in the care of Samanta Charlton.         Medical decision making: HIGH based on high risk of death, poor prognosis, management of more than one chronic illness in exacerbation    25 min ACP time spent discussing: goals of care, symptom assessment, code status, coordination of care and emotional support, formulating and communicating prognosis and goals of care, exploring burden/ benefit of various approaches of treatment, hospice education, ACP completion.      JOSE RamirezC  Palliative Medicine

## 2020-09-01 NOTE — H&P
[facility] Hospital Medicine  History & Physical    Patient Name: Samanta Charlton  MRN: [id number]  Admission Date: 8/31/2020  Attending Physician: Ki Roa MD   Primary Care Provider: Rudolph Law MD         Patient information was obtained from patient, past medical records and ER records.     Subjective:     Principal Problem:<principal problem not specified>    Chief Complaint:   Chief Complaint   Patient presents with    Shortness of Breath     Sent by PCP for elevated D-dimer.        HPI:    87 year old with history of hypertension ,hyperlipidemia , past history of smoking -45 years old smoking history stopped 17 years ago who presented with worsening shortness of breath since April that has gotten worse. The dyspnea is exacerbated with exertion.   She denies any history of long distance travel ,denies chest pain, nausea or vomiting .  In the ED, initial vitals showed.   Initial Vitals [08/31/20 1757]   BP Pulse Resp Temp SpO2   (!) 197/82 93 (!) 22 97.7 °F (36.5 °C) (!) 92 %     CTA of the chest showed -. There is matted lymphadenopathy in the mediastinum and right perihilar region.  There is an enlarged lymph node in the left axilla. It has a short axis measurement of 11 mm.  There are poorly visualized hypodense lesions scattered throughout the liver.  This is worrisome for metastatic disease.   She was seen in the ED and DNR status was discussed -she wants to be DNR.   Plan of care was also discussed with the daughter via phone at bedside.     Past Medical History:   Diagnosis Date    Acute ischemic stroke     Allergic rhinitis     HTN (hypertension)     Hyperlipidemia     Osteoporosis, unspecified     Sinusitis        Past Surgical History:   Procedure Laterality Date    HYSTERECTOMY         Review of patient's allergies indicates:   Allergen Reactions    Ace inhibitors Nausea And Vomiting    Amlodipine Edema    Codeine      Other reaction(s): Hallucinations     Hydrochlorothiazide Nausea And Vomiting    Sulfa (sulfonamide antibiotics) Nausea And Vomiting    Benicar  [olmesartan] Rash       No current facility-administered medications on file prior to encounter.      Current Outpatient Medications on File Prior to Encounter   Medication Sig    b complex vitamins tablet Take 1 tablet by mouth once daily.    calcium-vitamin D3 500 mg(1,250mg) -200 unit per tablet Take 1 tablet by mouth 2 (two) times daily with meals.    fish oil-omega-3 fatty acids 300-1,000 mg capsule Take by mouth once daily.    magnesium 30 mg Tab Take by mouth once.    multivitamin with minerals tablet Take 1 tablet by mouth once daily.    SYMBICORT 160-4.5 mcg/actuation HFAA Inhale 2 puffs into the lungs 2 (two) times daily.    turmeric root extract 500 mg Cap Take by mouth.    acetaminophen (TYLENOL) 500 MG tablet Take 500 mg by mouth every 6 (six) hours as needed for Pain.    aspirin (ECOTRIN) 81 MG EC tablet Take 1 tablet (81 mg total) by mouth once daily.    carvediloL (COREG) 12.5 MG tablet Take 1 tablet (12.5 mg total) by mouth 2 (two) times daily with meals.    furosemide (LASIX) 20 MG tablet Take 1 tablet (20 mg total) by mouth 2 (two) times daily. for 5 days    [DISCONTINUED] carvediloL (COREG) 6.25 MG tablet Take 6.25 mg by mouth 2 (two) times daily with meals.     Family History     None        Tobacco Use    Smoking status: Former Smoker    Smokeless tobacco: Never Used   Substance and Sexual Activity    Alcohol use: No     Alcohol/week: 0.0 standard drinks    Drug use: No    Sexual activity: Never     Review of Systems   Constitutional: Positive for activity change, appetite change and fatigue. Negative for chills, diaphoresis, fever and unexpected weight change.   HENT: Negative for congestion, dental problem, drooling, ear discharge, ear pain, facial swelling, hearing loss and mouth sores.    Eyes: Negative for discharge and itching.   Respiratory: Positive for shortness  of breath. Negative for apnea and chest tightness.    Gastrointestinal: Negative for abdominal distention, abdominal pain, anal bleeding and blood in stool.   Endocrine: Negative for cold intolerance and heat intolerance.   Musculoskeletal: Negative for arthralgias, back pain and gait problem.   Neurological: Negative for dizziness and facial asymmetry.   Psychiatric/Behavioral: Negative for behavioral problems.     Objective:     Vital Signs (Most Recent):  Temp: 97.7 °F (36.5 °C) (08/31/20 1757)  Pulse: 86 (08/31/20 2200)  Resp: (!) 24 (08/31/20 2200)  BP: (!) 186/81 (08/31/20 2200)  SpO2: (!) 90 % (08/31/20 2200) Vital Signs (24h Range):  Temp:  [97.7 °F (36.5 °C)-97.9 °F (36.6 °C)] 97.7 °F (36.5 °C)  Pulse:  [] 86  Resp:  [22-29] 24  SpO2:  [89 %-95 %] 90 %  BP: (152-229)/() 186/81     Weight: 64.1 kg (141 lb 6.8 oz)  Body mass index is 24.28 kg/m².    Physical Exam  Vitals signs and nursing note reviewed.   Constitutional:       Appearance: She is well-developed.   HENT:      Head: Normocephalic and atraumatic.   Eyes:      Pupils: Pupils are equal, round, and reactive to light.   Neck:      Musculoskeletal: Normal range of motion and neck supple.      Thyroid: No thyromegaly.      Trachea: No tracheal deviation.   Cardiovascular:      Rate and Rhythm: Normal rate and regular rhythm.   Pulmonary:      Effort: Respiratory distress present.      Breath sounds: No wheezing or rales.   Abdominal:      General: Bowel sounds are normal. There is no distension.      Palpations: Abdomen is soft.      Tenderness: There is no abdominal tenderness.   Skin:     General: Skin is warm and dry.      Coloration: Skin is not pale.   Neurological:      Mental Status: She is alert and oriented to person, place, and time.      Cranial Nerves: No cranial nerve deficit.      Coordination: Coordination normal.      Deep Tendon Reflexes: Reflexes are normal and symmetric.   Psychiatric:         Thought Content: Thought  "content normal.         Judgment: Judgment normal.           CRANIAL NERVES     CN III, IV, VI   Pupils are equal, round, and reactive to light.       Significant Labs:   Blood Culture: No results for input(s): LABBLOO in the last 48 hours.  BMP:   Recent Labs   Lab 08/31/20  1517   GLU 99      K 4.7      CO2 29   BUN 23   CREATININE 1.1   CALCIUM 10.1   MG 2.3     CBC:   Recent Labs   Lab 08/31/20  1517   WBC 7.93   HGB 15.4   HCT 46.6        All pertinent labs within the past 24 hours have been reviewed.    Significant Imaging: I have reviewed and interpreted all pertinent imaging results/findings within the past 24 hours.    Assessment/Plan:     Acute hypoxemic respiratory failure    Related to suspected metastatic disease.  CTA of the chest showed-    There has been interval development of a streaky opacity in the anterior aspect of the right lower lobe.  This is characteristic of subsegmental atelectasis or scarring.  4. There are mild emphysematous changes in both lungs.  5. There has been interval development of a tiny right pleural effusion.  Will need follow up ,pulmonology consult , continue oxygen supplementation       Chronic kidney disease, stage III (moderate)    Will avoid nephrotoxic meds -closely monitor renal function .    DNR (do not resuscitate)    Patient is DNR, will consult palliative care .     Abnormal chest CT    Chest ct scan is abnormal and is suggestive of metastatic disease- will follow oncology and will follow IR /oncology for which lesions to biopsy .  The goal is also to define and answer the question- "if there is cancer , will you get treatment and which therapy ".      Dyspnea    CTA of the chest showed -. There is matted lymphadenopathy in the mediastinum and right perihilar region.  There is an enlarged lymph node in the left axilla. It has a short axis measurement of 11 mm.  There are poorly visualized hypodense lesions scattered throughout the liver.  This " "is worrisome for metastatic disease.  This is suggestive of metastatic disease -   This patient clearly states- "even if I have cancer-I do not want surgery and I want to be DNR".  Will continue oxygen supplementation, consult palliative care , oncology .       LBBB (left bundle branch block)    This was seen on EKG -will continue cardiology follow up , trend cardiac enzymes     Essential hypertension    Will resume home meds- coreg.lasix , will add hydralazine prn.    Hyperlipidemia    On fish oil at home, will resume on discharge       VTE Risk Mitigation (From admission, onward)         Ordered     IP VTE HIGH RISK PATIENT  Once      08/31/20 2104     Place sequential compression device  Until discontinued      08/31/20 2104                   Addendum - Patient will be placed on observation  .    Nahum Dao MD  Department of Hospital Medicine   [facility]  "

## 2020-09-01 NOTE — SUBJECTIVE & OBJECTIVE
Past Medical History:   Diagnosis Date    Acute ischemic stroke     Allergic rhinitis     HTN (hypertension)     Hyperlipidemia     Osteoporosis, unspecified     Sinusitis        Past Surgical History:   Procedure Laterality Date    HYSTERECTOMY         Review of patient's allergies indicates:   Allergen Reactions    Ace inhibitors Nausea And Vomiting    Amlodipine Edema    Codeine      Other reaction(s): Hallucinations    Hydrochlorothiazide Nausea And Vomiting    Sulfa (sulfonamide antibiotics) Nausea And Vomiting    Benicar  [olmesartan] Rash       No current facility-administered medications on file prior to encounter.      Current Outpatient Medications on File Prior to Encounter   Medication Sig    b complex vitamins tablet Take 1 tablet by mouth once daily.    calcium-vitamin D3 500 mg(1,250mg) -200 unit per tablet Take 1 tablet by mouth 2 (two) times daily with meals.    fish oil-omega-3 fatty acids 300-1,000 mg capsule Take by mouth once daily.    magnesium 30 mg Tab Take by mouth once.    multivitamin with minerals tablet Take 1 tablet by mouth once daily.    SYMBICORT 160-4.5 mcg/actuation HFAA Inhale 2 puffs into the lungs 2 (two) times daily.    turmeric root extract 500 mg Cap Take by mouth.    acetaminophen (TYLENOL) 500 MG tablet Take 500 mg by mouth every 6 (six) hours as needed for Pain.    aspirin (ECOTRIN) 81 MG EC tablet Take 1 tablet (81 mg total) by mouth once daily.    carvediloL (COREG) 12.5 MG tablet Take 1 tablet (12.5 mg total) by mouth 2 (two) times daily with meals.    furosemide (LASIX) 20 MG tablet Take 1 tablet (20 mg total) by mouth 2 (two) times daily. for 5 days    [DISCONTINUED] carvediloL (COREG) 6.25 MG tablet Take 6.25 mg by mouth 2 (two) times daily with meals.     Family History     None        Tobacco Use    Smoking status: Former Smoker    Smokeless tobacco: Never Used   Substance and Sexual Activity    Alcohol use: No     Alcohol/week: 0.0  standard drinks    Drug use: No    Sexual activity: Never     Review of Systems   Constitutional: Positive for activity change, appetite change and fatigue. Negative for chills, diaphoresis, fever and unexpected weight change.   HENT: Negative for congestion, dental problem, drooling, ear discharge, ear pain, facial swelling, hearing loss and mouth sores.    Eyes: Negative for discharge and itching.   Respiratory: Positive for shortness of breath. Negative for apnea and chest tightness.    Gastrointestinal: Negative for abdominal distention, abdominal pain, anal bleeding and blood in stool.   Endocrine: Negative for cold intolerance and heat intolerance.   Musculoskeletal: Negative for arthralgias, back pain and gait problem.   Neurological: Negative for dizziness and facial asymmetry.   Psychiatric/Behavioral: Negative for behavioral problems.     Objective:     Vital Signs (Most Recent):  Temp: 97.7 °F (36.5 °C) (08/31/20 1757)  Pulse: 86 (08/31/20 2200)  Resp: (!) 24 (08/31/20 2200)  BP: (!) 186/81 (08/31/20 2200)  SpO2: (!) 90 % (08/31/20 2200) Vital Signs (24h Range):  Temp:  [97.7 °F (36.5 °C)-97.9 °F (36.6 °C)] 97.7 °F (36.5 °C)  Pulse:  [] 86  Resp:  [22-29] 24  SpO2:  [89 %-95 %] 90 %  BP: (152-229)/() 186/81     Weight: 64.1 kg (141 lb 6.8 oz)  Body mass index is 24.28 kg/m².    Physical Exam  Vitals signs and nursing note reviewed.   Constitutional:       Appearance: She is well-developed.   HENT:      Head: Normocephalic and atraumatic.   Eyes:      Pupils: Pupils are equal, round, and reactive to light.   Neck:      Musculoskeletal: Normal range of motion and neck supple.      Thyroid: No thyromegaly.      Trachea: No tracheal deviation.   Cardiovascular:      Rate and Rhythm: Normal rate and regular rhythm.   Pulmonary:      Effort: Respiratory distress present.      Breath sounds: No wheezing or rales.   Abdominal:      General: Bowel sounds are normal. There is no distension.       Palpations: Abdomen is soft.      Tenderness: There is no abdominal tenderness.   Skin:     General: Skin is warm and dry.      Coloration: Skin is not pale.   Neurological:      Mental Status: She is alert and oriented to person, place, and time.      Cranial Nerves: No cranial nerve deficit.      Coordination: Coordination normal.      Deep Tendon Reflexes: Reflexes are normal and symmetric.   Psychiatric:         Thought Content: Thought content normal.         Judgment: Judgment normal.           CRANIAL NERVES     CN III, IV, VI   Pupils are equal, round, and reactive to light.       Significant Labs:   Blood Culture: No results for input(s): LABBLOO in the last 48 hours.  BMP:   Recent Labs   Lab 08/31/20  1517   GLU 99      K 4.7      CO2 29   BUN 23   CREATININE 1.1   CALCIUM 10.1   MG 2.3     CBC:   Recent Labs   Lab 08/31/20  1517   WBC 7.93   HGB 15.4   HCT 46.6        All pertinent labs within the past 24 hours have been reviewed.    Significant Imaging: I have reviewed and interpreted all pertinent imaging results/findings within the past 24 hours.

## 2020-09-01 NOTE — PLAN OF CARE
Pt AAO x4.  VSS  Pt able to make needs known.  Pt remained afebrile throughout this shift.    Pt remained free of falls this shift.   Pt denies pain this shift.  Plan of care reviewed. Patient verbalizes understanding.   Pt moving/turing independent. Frequent weight shifting encouraged.  Patient sinus rhythm on monitor.   Bed low, side rails up x 2, wheels locked, call light in reach.   Hourly rounding completed.   Will continue to monitor.

## 2020-09-01 NOTE — ASSESSMENT & PLAN NOTE
"  Chest ct scan is abnormal and is suggestive of metastatic disease- will follow oncology and will follow IR /oncology for which lesions to biopsy .  The goal is also to define and answer the question- "if there is cancer , will you get treatment and which therapy ".    "

## 2020-09-01 NOTE — PROGRESS NOTES
[facility] -   Palliative Care       Patient Name: Samanta Charlton  MRN: [id number]  Admission Date: 8/31/2020  Hospital Length of Stay: 0 days  Code Status: DNR   Attending Provider: Ki Roa MD  Palliative Care Provider: Sharon Munson PA-C  Primary Care Physician: Rudolph Law MD  Principal Problem:<principal problem not specified>    Reason for Referral: goc/acp  Primary CM/SW: Queenie Dickey RN    Advance Care Planning   Met with patient and her daughter, Pau, following their visit with Sharon Munson PA-C. Patient would like to complete [employer] and  and LaPOST today. Patient designated her daughter Pau Charlton as HCPOA and her other daughter Jo Mcmullen as alternative. Patient has also elected to be DNR/comfort focused care. Patient states regardless of what she will learn at f/u with oncology, it is not likely she will pursue any cancer related treatment. Patient signed choice form for [facility] so an info visit could be done with possible enrollment after her oncology f/u. HCPOA and LaPOST scanned into chart and returned to patient and Pau.      Patient stated she was appreciative of everyone's help and support, and joked that she would also appreciate if someone could help her get a hair brush. Message was sent to her RN/CNA so hair brush could be delivered to room.  Rosamaria with [facility] was notified of referral and here at hospital to meet with patient. CM also updated on plan, as well.       Anel Luque, MANNY, [facility]  [phone]

## 2020-09-01 NOTE — ASSESSMENT & PLAN NOTE
Related to suspected metastatic disease.  CTA of the chest showed-    There has been interval development of a streaky opacity in the anterior aspect of the right lower lobe.  This is characteristic of subsegmental atelectasis or scarring.  4. There are mild emphysematous changes in both lungs.  5. There has been interval development of a tiny right pleural effusion.  Will need follow up ,pulmonology consult , continue oxygen supplementation

## 2020-09-01 NOTE — PROGRESS NOTES
Home Oxygen Evaluation    Date Performed: 9/1/2020    1) Patient's Home O2 Sat on room air, while at rest: 88        If O2 sats on room air at rest are 88% or below, patient qualifies. No additional testing needed. Document N/A in steps 2 and 3. If 89% or above, complete steps 2.      2) Patient's O2 Sat on room air while exercising: na        If O2 sats on room air while exercising remain 89% or above patient does not qualify, no further testing needed Document N/A in step 3. If O2 sats on room air while exercising are 88% or below, continue to step 3.      3) Patient's O2 Sat while exercising on O2: 98 at 2 LPM         (Must show improvement from #2 for patients to qualify)    If O2 sats improve on oxygen, patient qualifies for portable oxygen. If not, the patient does not qualify.

## 2020-09-01 NOTE — ASSESSMENT & PLAN NOTE
Mediastinal lymphadenopathy, matted, with axillary lymphadenopathy and liver lesions.  Suspected malignancy.  Tumor markers sent.  Discussed with patient and daughter, not willing to proceed with invasive workup.  They can follow-up as outpatient.

## 2020-09-01 NOTE — PLAN OF CARE
Swer met with pt and pt's daughter at bedside for initial assessment. Pt's daughter was able to verify  and address. Pt was currently living alone and was independent with ADLs prior to admission. Pt's daughter also reported that pt's son lives close by if pt needed assistance. Pt did not have any HH in the past and does not use any DME. Pt is active with my chart and agreed to bedside pharmacy. Pt's daughter reported pt spoke about advanced directive and mentioned she wanted to be a DNR. Swer provided a transitional care folder with information on advanced directives. Swer informed pt's daughter that she could fill about paper work and return back to [facility]. Swer left contact information for any needs/questions.    PCP; Rudolph Law MD    [facility] [address] [place] [id number] [address] [address]  Phone: [phone] Fax: [phone]         20 1030   Discharge Assessment   Assessment Type Discharge Planning Assessment   Confirmed/corrected address and phone number on facesheet? Yes   Assessment information obtained from? Caregiver  (Dave Graham (daughter))   Expected Length of Stay (days) 1   Communicated expected length of stay with patient/caregiver yes   Prior to hospitilization cognitive status: Alert/Oriented   Prior to hospitalization functional status: Independent   Current cognitive status: Alert/Oriented   Current Functional Status: Independent   Facility Arrived From: home   Lives With alone   Able to Return to Prior Arrangements yes   Is patient able to care for self after discharge? Unable to determine at this time (comments)  (pt was consulted by pallative care)   Who are your caregiver(s) and their phone number(s)? Dave Graham (daughter) [phone]   Patient's perception of discharge disposition home or selfcare   Readmission Within the Last 30 Days no previous admission in last 30 days   Patient currently being followed by outpatient  case management? No   Patient currently receives any other outside agency services? No   Equipment Currently Used at Home none   Do you have any problems affording any of your prescribed medications? No   Is the patient taking medications as prescribed? yes   Does the patient have transportation home? Yes   Transportation Anticipated family or friend will provide   Does the patient receive services at the [facility]mad[facility]? No   Discharge Plan A Home with family   DME Needed Upon Discharge  none   Patient/Family in Agreement with Plan yes

## 2020-09-01 NOTE — HOSPITAL COURSE
Evaluated by PMD outside, D-dimer were elevated a CT chest PE protocol was performed.  No PE noted.  There was matted mediastinal lymphadenopathy and also add lymphadenopathy, if lesions noted.

## 2020-09-01 NOTE — ED NOTES
The patient is awake, alert and cooperative with a calm affect, patient is aware of environment, airway is open and patent. Pt reports shortness of breath, normal effort and rate noted in bed at this time. Skin warm and dry, moves all extremities well, appearance: no apparent distress noted, bed placed in low position, side rails up x 2, call light is within reach of patient. patient offers no complaints at this time, will continue to monitor. Tylenol not given as patient denies pain at this time.

## 2020-09-01 NOTE — HPI
87-year-old female patient admitted for worsening shortness of breath over the last 3-4 months.  She is known with history of cerebrovascular accident, smoking more than 30 years quit about 8 years ago, has been complaining of worsening shortness of breath rare cough no wheezing no sputum production.  She has also experienced weight loss and loss of appetite.

## 2020-09-01 NOTE — ASSESSMENT & PLAN NOTE
"  CTA of the chest showed -. There is matted lymphadenopathy in the mediastinum and right perihilar region.  There is an enlarged lymph node in the left axilla. It has a short axis measurement of 11 mm.  There are poorly visualized hypodense lesions scattered throughout the liver.  This is worrisome for metastatic disease.  This is suggestive of metastatic disease -   This patient clearly states- "even if I have cancer-I do not want surgery and I want to be DNR".  Will continue oxygen supplementation, consult palliative care , oncology .     "

## 2020-09-01 NOTE — SUBJECTIVE & OBJECTIVE
Past Medical History:   Diagnosis Date    Acute ischemic stroke     Allergic rhinitis     HTN (hypertension)     Hyperlipidemia     Osteoporosis, unspecified     Sinusitis        Past Surgical History:   Procedure Laterality Date    HYSTERECTOMY         Review of patient's allergies indicates:   Allergen Reactions    Ace inhibitors Nausea And Vomiting    Amlodipine Edema    Codeine      Other reaction(s): Hallucinations    Hydrochlorothiazide Nausea And Vomiting    Sulfa (sulfonamide antibiotics) Nausea And Vomiting    Benicar  [olmesartan] Rash       Family History     None        Tobacco Use    Smoking status: Former Smoker    Smokeless tobacco: Never Used   Substance and Sexual Activity    Alcohol use: No     Alcohol/week: 0.0 standard drinks    Drug use: No    Sexual activity: Never         Review of Systems   Constitutional: Positive for activity change, appetite change, fatigue and unexpected weight change.   HENT: Positive for congestion and hearing loss.    Respiratory: Positive for shortness of breath.    Cardiovascular: Negative for chest pain.   Gastrointestinal: Negative for abdominal pain.   Endocrine: Negative for polyphagia.   Genitourinary: Negative for hematuria.   Musculoskeletal: Positive for arthralgias, back pain, gait problem, myalgias and neck stiffness.   Skin: Positive for pallor.   Allergic/Immunologic: Negative for immunocompromised state.   Neurological: Positive for tremors. Negative for seizures.   Hematological: Positive for adenopathy.   Psychiatric/Behavioral: Negative for behavioral problems.     Objective:     Vital Signs (Most Recent):  Temp: 96.5 °F (35.8 °C) (09/01/20 1133)  Pulse: 68 (09/01/20 1500)  Resp: 19 (09/01/20 1133)  BP: (!) 121/57 (09/01/20 1133)  SpO2: 95 % (09/01/20 1133) Vital Signs (24h Range):  Temp:  [96.5 °F (35.8 °C)-98.3 °F (36.8 °C)] 96.5 °F (35.8 °C)  Pulse:  [] 68  Resp:  [18-29] 19  SpO2:  [89 %-99 %] 95 %  BP: (121-229)/()  121/57     Weight: 98.7 kg (217 lb 9.5 oz)  Body mass index is 37.35 kg/m².    No intake or output data in the 24 hours ending 09/01/20 1511    Physical Exam  Vitals signs and nursing note reviewed.   Constitutional:       General: She is not in acute distress.     Appearance: She is well-developed. She is ill-appearing.   HENT:      Head: Normocephalic and atraumatic.   Neck:      Musculoskeletal: Neck supple.   Cardiovascular:      Rate and Rhythm: Normal rate and regular rhythm.   Pulmonary:      Effort: Pulmonary effort is normal. No respiratory distress.      Breath sounds: No rhonchi.   Abdominal:      Palpations: Abdomen is soft.      Tenderness: There is no abdominal tenderness.   Musculoskeletal:         General: Tenderness present.   Skin:     Coloration: Skin is pale.   Neurological:      Mental Status: She is alert and oriented to person, place, and time.   Psychiatric:         Mood and Affect: Mood normal.         Behavior: Behavior normal.         Thought Content: Thought content normal.         Judgment: Judgment normal.         Vents:  Oxygen Concentration (%): 28 (09/01/20 0840)    Lines/Drains/Airways     Peripheral Intravenous Line                 Peripheral IV - Single Lumen 08/31/20 1831 20 G Left Antecubital less than 1 day                Significant Labs:    CBC/Anemia Profile:  Recent Labs   Lab 08/31/20  1517   WBC 7.93   HGB 15.4   HCT 46.6      MCV 97   RDW 13.1        Chemistries:  Recent Labs   Lab 08/31/20  1517      K 4.7      CO2 29   BUN 23   CREATININE 1.1   CALCIUM 10.1   ALBUMIN 4.2   PROT 7.4   BILITOT 0.4   ALKPHOS 97   ALT 27   AST 33   MG 2.3     EKG 08/31/2020    Sinus tachycardia   Left bundle branch block      CT chest PE protocol 08/31/2020    This is a limited examination secondary to patient motion.  There is no large or central pulmonary embolus.  A small or peripheral pulmonary embolus cannot be excluded secondary to the motion artifact.  The size of  heart is within normal limits.  There is matted lymphadenopathy in the mediastinum and right perihilar region.  There is an enlarged lymph node in the left axilla.  It has a short axis measurement of 11 mm.  There is a moderate amount of atherosclerosis.  There is a 4 mm calcified granuloma in the right lower lobe.  There has been interval development of a streaky opacity in the anterior aspect of the right lower lobe.  There is no evidence of an acute pulmonary process in the left lung.  There are mild emphysematous changes in both lungs.  There has been interval development of a tiny right pleural effusion.  There is no pneumothorax.  There are poorly visualized hypodense lesions scattered throughout the liver.

## 2020-09-01 NOTE — ASSESSMENT & PLAN NOTE
Palliative care consult appreciated.  Patient DNR.  Not wishing to proceed with invasive workup.  Might obtain PET scan as outpatient.  Oncology follow-up.

## 2020-09-01 NOTE — HPI
87 year old with history of hypertension ,hyperlipidemia , past history of smoking -45 years old smoking history stopped 17 years ago who presented with worsening shortness of breath since April that has gotten worse. The dyspnea is exacerbated with exertion.   She denies any history of long distance travel ,denies chest pain, nausea or vomiting .  In the ED, initial vitals showed.   Initial Vitals [08/31/20 1757]   BP Pulse Resp Temp SpO2   (!) 197/82 93 (!) 22 97.7 °F (36.5 °C) (!) 92 %     CTA of the chest showed -. There is matted lymphadenopathy in the mediastinum and right perihilar region.  There is an enlarged lymph node in the left axilla. It has a short axis measurement of 11 mm.  There are poorly visualized hypodense lesions scattered throughout the liver.  This is worrisome for metastatic disease.   She was seen in the ED and DNR status was discussed -she wants to be DNR.   Plan of care was also discussed with the daughter via phone at bedside.

## 2020-09-01 NOTE — CONSULTS
[facility] -   Hematology/Oncology  Consult Note    Patient Name: Samanta Charlton  MRN: [id number]  Admission Date: 8/31/2020  Hospital Length of Stay: 0 days  Code Status: DNR   Attending Provider: Ki Roa MD  Consulting Provider: Radha Nieto NP  Primary Care Physician: Rudolph Law MD  Principal Problem:<principal problem not specified>    Inpatient consult to Hematology/Oncology  Consult performed by: Radha Nieto NP  Consult ordered by: Nahum Dao MD  Reason for consult: Abnormal CT  Assessment/Recommendations: Abnormal chest CT  Oncology was consulted due to results of the CT of the chest, results as follows:  CTA of the chest showed -. There is matted lymphadenopathy in the mediastinum and right perihilar region.  There is an enlarged lymph node in the left axilla. It has a short axis measurement of 11 mm.  There are poorly visualized hypodense lesions scattered throughout the liver.  This is worrisome for metastatic disease.   Patient does have a remote smoking history, family reports patient was at normal functional status until the last few months and have noted a sharp decline in her status during this time.  Lab orders placed:  CEA, AFP, CA 15-3, CA 27-29, CA 19-9.  Discuss this with daughter at bedside, daughter stated patient will likely not want treatment if a cancer diagnosis is confirmed.  Plan is for possible discharge home today, will see patient as outpatient to discuss results.            Subjective:     HPI:  87 year old with history of hypertension ,hyperlipidemia , past history of smoking -45 years old smoking history stopped 17 years ago who presented with worsening shortness of breath since April that has gotten worse. The dyspnea is exacerbated with exertion.   She denies any history of long distance travel, denies chest pain, nausea or vomiting. CTA of the chest showed -. There is matted lymphadenopathy in the mediastinum and right perihilar region.  There is  an enlarged lymph node in the left axilla. It has a short axis measurement of 11 mm.  There are poorly visualized hypodense lesions scattered throughout the liver.  This is worrisome for metastatic disease. She was seen in the ED and DNR status was discussed -she wants to be DNR.   Plan of care was also discussed with the daughter via phone at bedside.     Oncology Treatment Plan:   [No treatment plan]    Medications:  Continuous Infusions:  Scheduled Meds:   arformoteroL  15 mcg Nebulization BID    aspirin  81 mg Oral Daily    budesonide  0.5 mg Nebulization Q12H    carvediloL  12.5 mg Oral BID WM    enoxaparin  40 mg Subcutaneous Q24H     PRN Meds:dextrose 50%, dextrose 50%, glucagon (human recombinant), glucose, glucose, hydrALAZINE, sodium chloride 0.9%     Review of patient's allergies indicates:   Allergen Reactions    Ace inhibitors Nausea And Vomiting    Amlodipine Edema    Codeine      Other reaction(s): Hallucinations    Hydrochlorothiazide Nausea And Vomiting    Sulfa (sulfonamide antibiotics) Nausea And Vomiting    Benicar  [olmesartan] Rash        Past Medical History:   Diagnosis Date    Acute ischemic stroke     Allergic rhinitis     HTN (hypertension)     Hyperlipidemia     Osteoporosis, unspecified     Sinusitis      Past Surgical History:   Procedure Laterality Date    HYSTERECTOMY       Family History     None        Tobacco Use    Smoking status: Former Smoker    Smokeless tobacco: Never Used   Substance and Sexual Activity    Alcohol use: No     Alcohol/week: 0.0 standard drinks    Drug use: No    Sexual activity: Never       Review of Systems   Constitutional: Positive for activity change, appetite change and fatigue. Negative for chills, diaphoresis, fever and unexpected weight change.   HENT: Negative for congestion, hearing loss, mouth sores, postnasal drip, rhinorrhea, sore throat and trouble swallowing.    Eyes: Negative for discharge and visual disturbance.    Respiratory: Negative for cough, chest tightness and shortness of breath.    Cardiovascular: Negative for chest pain, palpitations and leg swelling.   Gastrointestinal: Positive for abdominal distention and abdominal pain. Negative for blood in stool, constipation, diarrhea, nausea and vomiting.   Endocrine: Negative for cold intolerance and heat intolerance.   Genitourinary: Negative for difficulty urinating, dyspareunia, flank pain and hematuria.   Musculoskeletal: Positive for arthralgias. Negative for back pain, gait problem and myalgias.   Skin: Negative.    Neurological: Negative for dizziness, weakness, light-headedness and headaches.   Hematological: Negative for adenopathy. Does not bruise/bleed easily.   Psychiatric/Behavioral: Negative for agitation, behavioral problems, confusion and hallucinations. The patient is not nervous/anxious.      Objective:     Vital Signs (Most Recent):  Temp: 98.3 °F (36.8 °C) (09/01/20 0718)  Pulse: 76 (09/01/20 0842)  Resp: 18 (09/01/20 0842)  BP: (!) 171/72 (09/01/20 0718)  SpO2: 98 % (09/01/20 0842) Vital Signs (24h Range):  Temp:  [97.7 °F (36.5 °C)-98.3 °F (36.8 °C)] 98.3 °F (36.8 °C)  Pulse:  [] 76  Resp:  [18-29] 18  SpO2:  [89 %-99 %] 98 %  BP: (133-229)/() 171/72     Weight: 64.1 kg (141 lb 6.8 oz)  Body mass index is 24.28 kg/m².  Body surface area is 1.7 meters squared.    No intake or output data in the 24 hours ending 09/01/20 1056    Physical Exam  Vitals signs and nursing note reviewed.   Constitutional:       General: She is not in acute distress.     Appearance: She is underweight.   HENT:      Head: Normocephalic and atraumatic.      Right Ear: Hearing and external ear normal.      Left Ear: Hearing and external ear normal.      Nose: No rhinorrhea.      Right Sinus: No maxillary sinus tenderness or frontal sinus tenderness.      Left Sinus: No maxillary sinus tenderness or frontal sinus tenderness.      Mouth/Throat:      Mouth: No oral  lesions.      Pharynx: Uvula midline.   Eyes:      General:         Right eye: No discharge.         Left eye: No discharge.      Conjunctiva/sclera: Conjunctivae normal.      Pupils: Pupils are equal, round, and reactive to light.   Neck:      Musculoskeletal: Normal range of motion.      Thyroid: No thyromegaly.      Vascular: No carotid bruit.      Trachea: No tracheal deviation.   Cardiovascular:      Rate and Rhythm: Normal rate and regular rhythm.      Pulses:           Dorsalis pedis pulses are 2+ on the right side and 2+ on the left side.      Heart sounds: Normal heart sounds, S1 normal and S2 normal. No murmur.   Pulmonary:      Effort: Pulmonary effort is normal. No respiratory distress.      Breath sounds: Examination of the right-lower field reveals decreased breath sounds. Examination of the left-lower field reveals decreased breath sounds. Decreased breath sounds present.   Abdominal:      General: Bowel sounds are normal. There is no distension.      Palpations: Abdomen is soft. There is no mass.      Tenderness: There is no abdominal tenderness.   Musculoskeletal: Normal range of motion.   Lymphadenopathy:      Cervical: No cervical adenopathy.      Upper Body:      Right upper body: No supraclavicular adenopathy.      Left upper body: No supraclavicular adenopathy.   Skin:     General: Skin is warm and dry.      Capillary Refill: Capillary refill takes less than 2 seconds.      Findings: No rash.   Neurological:      Mental Status: She is oriented to person, place, and time. She is lethargic.      Sensory: No sensory deficit.      Coordination: Coordination normal.      Gait: Gait normal.   Psychiatric:         Mood and Affect: Mood is not anxious or depressed.         Speech: Speech normal.         Behavior: Behavior normal.         Thought Content: Thought content normal.         Judgment: Judgment normal.         Significant Labs:   CBC:   Recent Labs   Lab 08/31/20  1517   WBC 7.93   HGB 15.4    HCT 46.6       and CMP:   Recent Labs   Lab 08/31/20  1517      K 4.7      CO2 29   GLU 99   BUN 23   CREATININE 1.1   CALCIUM 10.1   PROT 7.4   ALBUMIN 4.2   BILITOT 0.4   ALKPHOS 97   AST 33   ALT 27   ANIONGAP 12   EGFRNONAA 45*       Diagnostic Results:  I have reviewed all pertinent imaging results/findings within the past 24 hours.    Assessment/Plan:     Abnormal chest CT  Oncology was consulted due to results of the CT of the chest, results as follows:  CTA of the chest showed -. There is matted lymphadenopathy in the mediastinum and right perihilar region.  There is an enlarged lymph node in the left axilla. It has a short axis measurement of 11 mm.  There are poorly visualized hypodense lesions scattered throughout the liver.  This is worrisome for metastatic disease.   Patient does have a remote smoking history, family reports patient was at normal functional status until the last few months and have noted a sharp decline in her status during this time.  Lab orders placed:  CEA, AFP, CA 15-3, CA 27-29, CA 19-9.  Discuss this with daughter at bedside, daughter stated patient will likely not want treatment if a cancer diagnosis is confirmed.  Plan is for possible discharge home today, will see patient as outpatient to discuss results.          Thank you for your consult. I will follow-up with patient. Please contact us if you have any additional questions.    Radha Nieto NP  Hematology/Oncology  [facility]

## 2020-09-01 NOTE — PLAN OF CARE
Discharge orders and PA note sent via [employer]. Rosamaria with [facility] will order oxygen. Pt can discharge once oxygen arrives.        09/01/20 7234   Post-Acute Status   Post-Acute Authorization Hospice   Hospice Status Set-up Complete

## 2020-09-01 NOTE — HPI
87 year old with history of hypertension ,hyperlipidemia , past history of smoking -45 years old smoking history stopped 17 years ago who presented with worsening shortness of breath since April that has gotten worse. The dyspnea is exacerbated with exertion.   She denies any history of long distance travel, denies chest pain, nausea or vomiting. CTA of the chest showed -. There is matted lymphadenopathy in the mediastinum and right perihilar region.  There is an enlarged lymph node in the left axilla. It has a short axis measurement of 11 mm.  There are poorly visualized hypodense lesions scattered throughout the liver.  This is worrisome for metastatic disease. She was seen in the ED and DNR status was discussed -she wants to be DNR.   Plan of care was also discussed with the daughter via phone at bedside.

## 2020-09-01 NOTE — PROGRESS NOTES
Pt family member educated on home oxygen use/hazrds. All questions answered and understanding verbalized.

## 2020-09-01 NOTE — SUBJECTIVE & OBJECTIVE
Oncology Treatment Plan:   [No treatment plan]    Medications:  Continuous Infusions:  Scheduled Meds:   arformoteroL  15 mcg Nebulization BID    aspirin  81 mg Oral Daily    budesonide  0.5 mg Nebulization Q12H    carvediloL  12.5 mg Oral BID WM    enoxaparin  40 mg Subcutaneous Q24H     PRN Meds:dextrose 50%, dextrose 50%, glucagon (human recombinant), glucose, glucose, hydrALAZINE, sodium chloride 0.9%     Review of patient's allergies indicates:   Allergen Reactions    Ace inhibitors Nausea And Vomiting    Amlodipine Edema    Codeine      Other reaction(s): Hallucinations    Hydrochlorothiazide Nausea And Vomiting    Sulfa (sulfonamide antibiotics) Nausea And Vomiting    Benicar  [olmesartan] Rash        Past Medical History:   Diagnosis Date    Acute ischemic stroke     Allergic rhinitis     HTN (hypertension)     Hyperlipidemia     Osteoporosis, unspecified     Sinusitis      Past Surgical History:   Procedure Laterality Date    HYSTERECTOMY       Family History     None        Tobacco Use    Smoking status: Former Smoker    Smokeless tobacco: Never Used   Substance and Sexual Activity    Alcohol use: No     Alcohol/week: 0.0 standard drinks    Drug use: No    Sexual activity: Never       Review of Systems   Constitutional: Positive for activity change, appetite change and fatigue. Negative for chills, diaphoresis, fever and unexpected weight change.   HENT: Negative for congestion, hearing loss, mouth sores, postnasal drip, rhinorrhea, sore throat and trouble swallowing.    Eyes: Negative for discharge and visual disturbance.   Respiratory: Negative for cough, chest tightness and shortness of breath.    Cardiovascular: Negative for chest pain, palpitations and leg swelling.   Gastrointestinal: Positive for abdominal distention and abdominal pain. Negative for blood in stool, constipation, diarrhea, nausea and vomiting.   Endocrine: Negative for cold intolerance and heat intolerance.    Genitourinary: Negative for difficulty urinating, dyspareunia, flank pain and hematuria.   Musculoskeletal: Positive for arthralgias. Negative for back pain, gait problem and myalgias.   Skin: Negative.    Neurological: Negative for dizziness, weakness, light-headedness and headaches.   Hematological: Negative for adenopathy. Does not bruise/bleed easily.   Psychiatric/Behavioral: Negative for agitation, behavioral problems, confusion and hallucinations. The patient is not nervous/anxious.      Objective:     Vital Signs (Most Recent):  Temp: 98.3 °F (36.8 °C) (09/01/20 0718)  Pulse: 76 (09/01/20 0842)  Resp: 18 (09/01/20 0842)  BP: (!) 171/72 (09/01/20 0718)  SpO2: 98 % (09/01/20 0842) Vital Signs (24h Range):  Temp:  [97.7 °F (36.5 °C)-98.3 °F (36.8 °C)] 98.3 °F (36.8 °C)  Pulse:  [] 76  Resp:  [18-29] 18  SpO2:  [89 %-99 %] 98 %  BP: (133-229)/() 171/72     Weight: 64.1 kg (141 lb 6.8 oz)  Body mass index is 24.28 kg/m².  Body surface area is 1.7 meters squared.    No intake or output data in the 24 hours ending 09/01/20 1056    Physical Exam  Vitals signs and nursing note reviewed.   Constitutional:       General: She is not in acute distress.     Appearance: She is underweight.   HENT:      Head: Normocephalic and atraumatic.      Right Ear: Hearing and external ear normal.      Left Ear: Hearing and external ear normal.      Nose: No rhinorrhea.      Right Sinus: No maxillary sinus tenderness or frontal sinus tenderness.      Left Sinus: No maxillary sinus tenderness or frontal sinus tenderness.      Mouth/Throat:      Mouth: No oral lesions.      Pharynx: Uvula midline.   Eyes:      General:         Right eye: No discharge.         Left eye: No discharge.      Conjunctiva/sclera: Conjunctivae normal.      Pupils: Pupils are equal, round, and reactive to light.   Neck:      Musculoskeletal: Normal range of motion.      Thyroid: No thyromegaly.      Vascular: No carotid bruit.      Trachea: No  tracheal deviation.   Cardiovascular:      Rate and Rhythm: Normal rate and regular rhythm.      Pulses:           Dorsalis pedis pulses are 2+ on the right side and 2+ on the left side.      Heart sounds: Normal heart sounds, S1 normal and S2 normal. No murmur.   Pulmonary:      Effort: Pulmonary effort is normal. No respiratory distress.      Breath sounds: Examination of the right-lower field reveals decreased breath sounds. Examination of the left-lower field reveals decreased breath sounds. Decreased breath sounds present.   Abdominal:      General: Bowel sounds are normal. There is no distension.      Palpations: Abdomen is soft. There is no mass.      Tenderness: There is no abdominal tenderness.   Musculoskeletal: Normal range of motion.   Lymphadenopathy:      Cervical: No cervical adenopathy.      Upper Body:      Right upper body: No supraclavicular adenopathy.      Left upper body: No supraclavicular adenopathy.   Skin:     General: Skin is warm and dry.      Capillary Refill: Capillary refill takes less than 2 seconds.      Findings: No rash.   Neurological:      Mental Status: She is oriented to person, place, and time. She is lethargic.      Sensory: No sensory deficit.      Coordination: Coordination normal.      Gait: Gait normal.   Psychiatric:         Mood and Affect: Mood is not anxious or depressed.         Speech: Speech normal.         Behavior: Behavior normal.         Thought Content: Thought content normal.         Judgment: Judgment normal.         Significant Labs:   CBC:   Recent Labs   Lab 08/31/20  1517   WBC 7.93   HGB 15.4   HCT 46.6       and CMP:   Recent Labs   Lab 08/31/20  1517      K 4.7      CO2 29   GLU 99   BUN 23   CREATININE 1.1   CALCIUM 10.1   PROT 7.4   ALBUMIN 4.2   BILITOT 0.4   ALKPHOS 97   AST 33   ALT 27   ANIONGAP 12   EGFRNONAA 45*       Diagnostic Results:  I have reviewed all pertinent imaging results/findings within the past 24 hours.

## 2020-09-01 NOTE — ASSESSMENT & PLAN NOTE
History of smoking more than 30 years quit 25 years ago, start DuoNeb.  Outpatient pulmonary follow-up.

## 2020-09-02 ENCOUNTER — TELEPHONE (OUTPATIENT)
Dept: CARDIOLOGY | Facility: CLINIC | Age: 85
End: 2020-09-02

## 2020-09-02 ENCOUNTER — TELEPHONE (OUTPATIENT)
Dept: PULMONOLOGY | Facility: CLINIC | Age: 85
End: 2020-09-02

## 2020-09-02 ENCOUNTER — TELEPHONE (OUTPATIENT)
Dept: INTERNAL MEDICINE | Facility: CLINIC | Age: 85
End: 2020-09-02

## 2020-09-02 LAB
AFP SERPL-MCNC: 5.2 NG/ML (ref 0–8.4)
CANCER AG19-9 SERPL-ACNC: 4434 U/ML (ref 2–40)
CEA SERPL-MCNC: 10.5 NG/ML (ref 0–5)

## 2020-09-02 NOTE — HOSPITAL COURSE
Pt admitted to Telemetry Unit for Dyspnea.  Imaging showed matted lymphadenopathy in the mediastinum and right perihilar region.  There is an enlarged lymph node in the left axilla. It has a short axis measurement of 11 mm.  There are poorly visualized hypodense lesions scattered throughout the liver worrisome for metastatic disease. There is no large or central pulmonary embolus.  There is mild emphysematous changes in both lungs and tiny right pleural effusion. Heme/Onc and Pulmonology consulted.  Markers obtained:  CEA, AFP, CA 15-3, CA 27-29, CA 19-9 pending.  Outpatient PET scan also recommended if desired.  Home oxygen evaluation performed with patient qualifying for oxygen base on results.  Patient and family are in agreement and stated she will likely not want treatment if a cancer diagnosis is confirmed.  Palliative consulted.  Pt elected to be a DNR and discharge to home with [facility].  Pt seen and examined today and patient deemed suitable for discharge to home with hospice.  Current medications resumed and home oxygen to be  Pt to follow up with hospice company upon discharge for further evaluation.

## 2020-09-02 NOTE — TELEPHONE ENCOUNTER
----- Message from Stephanie Wood sent at 9/2/2020 12:57 PM CDT -----  Regarding: Hospice  Contact: Rosamaria- [facility]  Patient has been admitted to [facility] and Ms Blank needs to speak to someone regarding upcoming appt, please call her back at [phone]

## 2020-09-02 NOTE — PLAN OF CARE
09/02/20 0750   Final Note   Assessment Type Final Discharge Note   Anticipated Discharge Disposition [facility]ospice[facility]   [facility] Referral Info   Post Acute Recommendation No Care

## 2020-09-02 NOTE — TELEPHONE ENCOUNTER
[place]   The patient found out that she has cancer and no longer would like to do the nuclear or the echo. I let her know that she could call back to reschedule if she would like.

## 2020-09-02 NOTE — DISCHARGE SUMMARY
[facility] Hospital Medicine  Discharge Summary      Patient Name: Samanta Charlton  MRN: [id number]  Admission Date: 8/31/2020  Hospital Length of Stay: 0 days  Discharge Date and Time: 9/1/2020  4:34 PM  Attending Physician: Dr. Ki Roa   Discharging Provider: Rebecca Birch NP  Primary Care Provider: Rudolph Law MD      HPI:    87 year old with history of hypertension ,hyperlipidemia , past history of smoking -45 years old smoking history stopped 17 years ago who presented with worsening shortness of breath since April that has gotten worse. The dyspnea is exacerbated with exertion.   She denies any history of long distance travel ,denies chest pain, nausea or vomiting .  In the ED, initial vitals showed.   Initial Vitals [08/31/20 1757]   BP Pulse Resp Temp SpO2   (!) 197/82 93 (!) 22 97.7 °F (36.5 °C) (!) 92 %     CTA of the chest showed -. There is matted lymphadenopathy in the mediastinum and right perihilar region.  There is an enlarged lymph node in the left axilla. It has a short axis measurement of 11 mm.  There are poorly visualized hypodense lesions scattered throughout the liver.  This is worrisome for metastatic disease.   She was seen in the ED and DNR status was discussed -she wants to be DNR.   Plan of care was also discussed with the daughter via phone at bedside.     * No surgery found *      Hospital Course:   Pt admitted to Telemetry Unit for Dyspnea.  Imaging showed matted lymphadenopathy in the mediastinum and right perihilar region.  There is an enlarged lymph node in the left axilla. It has a short axis measurement of 11 mm.  There are poorly visualized hypodense lesions scattered throughout the liver worrisome for metastatic disease. There is no large or central pulmonary embolus.  There is mild emphysematous changes in both lungs and tiny right pleural effusion. Heme/Onc and Pulmonology consulted.  Markers obtained:  CEA, AFP, CA 15-3, CA 27-29, CA 19-9 pending.   Outpatient PET scan also recommended if desired.  Home oxygen evaluation performed with patient qualifying for oxygen base on results.  Patient and family are in agreement and stated she will likely not want treatment if a cancer diagnosis is confirmed.  Palliative consulted.  Pt elected to be a DNR and discharge to home with [facility].  Pt seen and examined today and patient deemed suitable for discharge to home with hospice.  Current medications resumed and home oxygen to be  Pt to follow up with hospice company upon discharge for further evaluation.        Consults:   Consults (From admission, onward)        Status Ordering Provider     Inpatient consult to Hematology/Oncology  Once     Provider:  Brandon Sterling MD    Completed CASSIDY WHITING     Inpatient consult to Palliative Care  Once     Provider:  Sharon Munson PA-C    Completed CASSIDY WHITING     Inpatient consult to Social Work/Case Management  Once     Provider:  (Not yet assigned)    KARLI Ricardo          Final Active Diagnoses:    Diagnosis Date Noted POA    PRINCIPAL PROBLEM:  Acute hypoxemic respiratory failure [J96.01] 08/31/2020 Yes    Encounter for palliative care [Z51.5] 09/01/2020 Not Applicable    Axillary lymphadenopathy [R59.0] 09/01/2020 No    Liver lesion [K76.9] 09/01/2020 No    Dyspnea [R06.00] 08/31/2020 Yes    Abnormal chest CT [R93.89] 08/31/2020 Yes    DNR (do not resuscitate) [Z66] 08/31/2020 Yes    Chronic kidney disease, stage III (moderate) [N18.3] 08/31/2020 Yes    LBBB (left bundle branch block) [I44.7] 08/31/2020 Yes    Hyperlipidemia [E78.5] 05/15/2017 Yes      Problems Resolved During this Admission:       Discharged Condition: stable    Disposition: Hospice/Home    Follow Up:  Follow-up Information     [facility].    Specialties: Hospice and Palliative Medicine, Hospice and Palliative Medicine  Why: Hospice  Contact information:  [address]  [id number]  [facility]  "[id number]  [phone]                 Patient Instructions:      OXYGEN FOR HOME USE     Order Specific Question Answer Comments   Liter Flow 2    Duration Continuous    Qualifying SpO2: 88%    Testing done at: [facility]    Device home concentrator with portable unit    Height: 5' 4" (1.626 m)    Weight: 98.7 kg (217 lb 9.5 oz)    Does patient have medical equipment at home? none    Alternative treatment measures have been tried or considered and deemed clinically ineffective. Yes      Diet Adult Regular     Activity as tolerated       Significant Diagnostic Studies:  -labs reviewed     Pending Diagnostic Studies:     Procedure Component Value Units Date/Time    Cancer antigen 15-3 [[id number]] Collected: 09/01/20 0927    Order Status: Sent Lab Status: In process Updated: 09/01/20 1423    Specimen: Blood     Cancer antigen 27.29 [[id number]] Collected: 09/01/20 0927    Order Status: Sent Lab Status: In process Updated: 09/01/20 1423    Specimen: Blood          Medications:  Reconciled Home Medications:      Medication List      CONTINUE taking these medications    acetaminophen 500 MG tablet  Commonly known as: TYLENOL  Take 500 mg by mouth every 6 (six) hours as needed for Pain.     aspirin 81 MG EC tablet  Commonly known as: ECOTRIN  Take 1 tablet (81 mg total) by mouth once daily.     b complex vitamins tablet  Take 1 tablet by mouth once daily.     calcium-vitamin D3 500 mg(1,250mg) -200 unit per tablet  Commonly known as: OS-KIRA 500 + D3  Take 1 tablet by mouth 2 (two) times daily with meals.     carvediloL 12.5 MG tablet  Commonly known as: COREG  Take 1 tablet (12.5 mg total) by mouth 2 (two) times daily with meals.     fish oil-omega-3 fatty acids 300-1,000 mg capsule  Take by mouth once daily.     magnesium 30 mg Tab  Take by mouth once.     multivitamin with minerals tablet  Take 1 tablet by mouth once daily.     SYMBICORT 160-4.5 mcg/actuation Hfaa  Generic drug: budesonide-formoterol 160-4.5 mcg  Inhale 2 puffs " into the lungs 2 (two) times daily.     turmeric root extract 500 mg Cap  Take by mouth.        ASK your doctor about these medications    furosemide 20 MG tablet  Commonly known as: LASIX  Take 1 tablet (20 mg total) by mouth 2 (two) times daily. for 5 days            Indwelling Lines/Drains at time of discharge:   Lines/Drains/Airways     None                 Time spent on the discharge of patient: > 40 minutes  Patient was seen and examined on the date of discharge and determined to be suitable for discharge.         Rebecca Birch NP  Department of Hospital Medicine  [facility]

## 2020-09-02 NOTE — TELEPHONE ENCOUNTER
Ask Audrey about this.     Pt admitted to hospice but strongly wants to see Dr. Law one last time. Nurse wanted to discuss option due to her technically falling under their own MDs care.

## 2020-09-02 NOTE — TELEPHONE ENCOUNTER
----- Message from Maryellen Maharaj sent at 9/2/2020  3:32 PM CDT -----  Regarding: PET Scan  Contact: Patient's daughter in law- Carolynn Charlton  Please call concerning patient's PET scan, has it been set up yet. Please call at  [phone] (Carolynn)

## 2020-09-03 LAB — CANCER AG15-3 SERPL-ACNC: 11 U/ML (ref 0–31)

## 2020-09-04 LAB — CANCER AG27-29 SERPL-ACNC: 15.2 U/ML

## 2020-09-19 ENCOUNTER — TELEPHONE (OUTPATIENT)
Dept: PULMONOLOGY | Facility: CLINIC | Age: 85
End: 2020-09-19

## 2020-09-22 ENCOUNTER — TELEPHONE (OUTPATIENT)
Dept: CARDIOLOGY | Facility: CLINIC | Age: 85
End: 2020-09-22

## 2020-09-22 NOTE — TELEPHONE ENCOUNTER
Left message for pt's daughter to rtc. Pt's daughter is calling in regards to getting a explanation on why she was referred to pulmonary office.